# Patient Record
Sex: MALE | Race: WHITE | NOT HISPANIC OR LATINO
[De-identification: names, ages, dates, MRNs, and addresses within clinical notes are randomized per-mention and may not be internally consistent; named-entity substitution may affect disease eponyms.]

---

## 2017-04-22 ENCOUNTER — APPOINTMENT (OUTPATIENT)
Dept: HUMAN REPRODUCTION | Facility: CLINIC | Age: 42
End: 2017-04-22

## 2017-04-24 ENCOUNTER — APPOINTMENT (OUTPATIENT)
Dept: HUMAN REPRODUCTION | Facility: CLINIC | Age: 42
End: 2017-04-24

## 2017-05-12 ENCOUNTER — OTHER (OUTPATIENT)
Age: 42
End: 2017-05-12

## 2017-05-12 DIAGNOSIS — N97.9 FEMALE INFERTILITY, UNSPECIFIED: ICD-10-CM

## 2017-05-12 RX ORDER — DOXYCYCLINE HYCLATE 100 MG/1
100 CAPSULE ORAL
Qty: 20 | Refills: 0 | Status: ACTIVE | COMMUNITY
Start: 2017-05-12 | End: 1900-01-01

## 2017-05-19 ENCOUNTER — APPOINTMENT (OUTPATIENT)
Dept: HUMAN REPRODUCTION | Facility: CLINIC | Age: 42
End: 2017-05-19

## 2019-07-01 ENCOUNTER — APPOINTMENT (OUTPATIENT)
Dept: ORTHOPEDIC SURGERY | Facility: CLINIC | Age: 44
End: 2019-07-01
Payer: COMMERCIAL

## 2019-07-01 VITALS
SYSTOLIC BLOOD PRESSURE: 114 MMHG | BODY MASS INDEX: 25.76 KG/M2 | HEART RATE: 87 BPM | DIASTOLIC BLOOD PRESSURE: 77 MMHG | HEIGHT: 68 IN | WEIGHT: 170 LBS

## 2019-07-01 PROCEDURE — 99203 OFFICE O/P NEW LOW 30 MIN: CPT

## 2019-07-02 NOTE — REASON FOR VISIT
[Initial Visit] : an initial visit for [Back Pain] : back pain [Radiculopathy] : radiculopathy [FreeTextEntry2] : Lower back pain

## 2019-07-02 NOTE — CONSULT LETTER
[Dear  ___] : Dear  [unfilled], [Consult Letter:] : I had the pleasure of evaluating your patient, [unfilled]. [Please see my note below.] : Please see my note below. [Consult Closing:] : Thank you very much for allowing me to participate in the care of this patient.  If you have any questions, please do not hesitate to contact me. [Sincerely,] : Sincerely, [FreeTextEntry3] : bOdulio Shankar MD, FAAOS

## 2019-07-02 NOTE — HISTORY OF PRESENT ILLNESS
[Pain Location] : pain [] : left leg [Lumbar] : lumbar region [___ mths] : [unfilled] month(s) ago [Walking] : walking [Sitting] : sitting [Standing] : standing [Constant] : ~He/She~ states the symptoms seem to be constant [Prolonged Sitting] : worsened by prolonged sitting [Prolonged Standing] : worsened by prolonged standing [de-identified] : Initial visit patient comes in today referred by Dr. Ksenia Pinto complaining of lower back pain with pain into the left leg has had pain for approximately 3 months. Patient had first gone to his neurologist for an initial workup of recent onset back pain and had brought in an MRI that was done on April 20, 2019 and is currently taking Cymbalta and Neurontin at night with some relief of pain patient has also undergone pain management with an epidural steroid injection which has improved significantly her pain to the lower back and into the left leg. Patient denies any trauma. PMH is significant for microdiscectomy several years ago for a left L 4-5 HNP. [NSAIDs] : not relieved by nonsteroidal anti-inflammatory drugs [Rest] : not relieved with rest [de-identified] : EDGAR

## 2019-07-02 NOTE — PHYSICAL EXAM
[Motor Strength Lower Extremities] : left (weak) [] : Sensory: [L3-LT] : L3 [L4-LT] : L4 [L5-LT] : L5 [ALL] : Biceps, brachioradialis, triceps, patellar, ankle and plantar 2+ and symmetric bilaterally [Normal RLE] : Right Lower Extremity: No scars, rashes, lesions, ulcers, skin intact [Normal LLE] : Left Lower Extremity: No scars, rashes, lesions, ulcers, skin intact [Normal DTR Reflexes] : DTR reflexes normal [Normal Touch] : sensation intact for touch [Normal] : Gait: normal [SLR] : negative straight leg raise [Poor Appearance] : well-appearing [Acute Distress] : not in acute distress [Obese] : not obese [Abl Affect] : with normal affect [Poor Coordination] : normal coordination [Disorientation] : oriented x 3 [de-identified] : +tension sign of the left leg with considerable weakness with left quad.\par weakness with toe walk [de-identified] : MRI of the lumbar spine April 20, 2019 \par three disc levels were noted of L2-3 tiny bulge at L3-4 but had the surgical procedure in the past of a discectomy with small residual. L4-5 spinal stenosis and left-sided impingement

## 2019-07-02 NOTE — DISCUSSION/SUMMARY
[Medication Risks Reviewed] : Medication risks reviewed [Surgical risks reviewed] : Surgical risks reviewed [de-identified] : Continue to followup with his neurologist.\par If there should be any increased weakness to that left leg immediately we follow up with us otherwise patient is instructed no bending no twisting no lifting more than 10 pounds and followup in 3 months

## 2019-09-16 ENCOUNTER — EMERGENCY (EMERGENCY)
Facility: HOSPITAL | Age: 44
LOS: 1 days | Discharge: HOME | End: 2019-09-16
Attending: EMERGENCY MEDICINE
Payer: COMMERCIAL

## 2019-09-16 VITALS
HEIGHT: 68 IN | HEART RATE: 83 BPM | DIASTOLIC BLOOD PRESSURE: 88 MMHG | WEIGHT: 169.98 LBS | SYSTOLIC BLOOD PRESSURE: 146 MMHG | RESPIRATION RATE: 17 BRPM | OXYGEN SATURATION: 100 % | TEMPERATURE: 98 F

## 2019-09-16 PROCEDURE — 99283 EMERGENCY DEPT VISIT LOW MDM: CPT

## 2019-09-16 PROCEDURE — 99282 EMERGENCY DEPT VISIT SF MDM: CPT

## 2019-09-17 NOTE — ED PROVIDER NOTE - CLINICAL SUMMARY MEDICAL DECISION MAKING FREE TEXT BOX
ATTG: : headache that resolved. possible post epidural headache / migraine / tension headche. vitals wnl, neuro intact. and headache resolved. does not want any meds. dc home to follow with his pmd and neuro. return precautions provided.

## 2019-09-17 NOTE — ED PROVIDER NOTE - PATIENT PORTAL LINK FT
You can access the FollowMyHealth Patient Portal offered by [facility] by registering at the following website: [email or web address]. By joining [facility]’s FollowMyHealth portal, you will also be able to view your health information using other applications (apps) compatible with our system.

## 2019-09-17 NOTE — ED ADULT NURSE NOTE - OBJECTIVE STATEMENT
43 yo male c/o headache, since resolved since coming to ED. Pt evaluated by MD, no interventions/tests performed. Plan for DC.

## 2019-09-17 NOTE — ED PROVIDER NOTE - NS ED MD EM SELECTION
SHIFT SUMMARY
PT A&O X4 T/O SHIFT. POD#6 ILEOSTOMY TAKE DOWN; DRESSING CHANGE PER
ORDERS. SCANT DRAINAGE ON DRESSING. ABD SOFT; BTX4; PT PASSING GAS AND
LOOSE STOOL. PT DENIES NAUSEA. PAIN MANGED PER EMAR. PT BED MOBILE;
REPOSITIONS SELF IN BED.  PT UP TO BSC WITH FWW AND SBA. PT AMBULATED IN PUGA
X1. RA; PT DENIED SOB AND CP T/O SHIFT. CALL LIGHT IN REACH; PT DEMONSTRATES
USE. REPORT GIVEN TO DAY SHIFT RN. [id number] Exp Problem Focused - Mod. Complex

## 2019-09-17 NOTE — ED PROVIDER NOTE - ATTENDING CONTRIBUTION TO CARE
43 y/o m with pmhx disc disease, went for epidural last week presents for headache that began this am when he woke up. no fever or chills. no neck pain. no trauma. no change in vision or hearing or speech. no dizziness or difficulty with gait. no urinary complaints. no other concerns. here with father at the bedside. took apap prior to coming to ED. headache not associated with incontinence / weakness or numbness. concerned that usually does not get headaches like this. headache has resolved while waiting here.   PMD and Neuro thru [facility]  Gen no acute distress  HEENT:  pupils 2.5 mm and reactive to light. no nystagmus. no facial asymmetry. no tongue deviation.  Lungs:  b/l BS  CVS: S1S2   Abd;  soft non tender no distention  Ext: no pitting edema or erythema  Neuro: aaox3 no focal deficits, normal finger to nose, steady gait.   MSK: strength 5/5 b/l upper and lower ext.

## 2019-09-17 NOTE — ED PROVIDER NOTE - OBJECTIVE STATEMENT
45 y/o m with pmhx disc disease, went for epidural last week presents for headache that began this am when he woke up. no fever or chills. no neck pain. no trauma. no change in vision or hearing or speech. no dizziness or difficulty with gait. no urinary complaints. no other concerns. here with father at the bedside. took apap prior to coming to ED. headache not associated with incontinence / weakness or numbness. concerned that usually does not get headaches like this. headache has resolved while waiting here.  PMD and Neuro thru [facility]

## 2019-09-17 NOTE — ED PROVIDER NOTE - NSFOLLOWUPINSTRUCTIONS_ED_ALL_ED_FT
Headache    A headache is pain or discomfort felt around the head or neck area. The specific cause of a headache may not be found as there are many types including tension headaches, migraine headaches, and cluster headaches. Watch your condition for any changes. Things you can do to manage your pain include taking over the counter and prescription medications as instructed by your health care provider, lying down in a dark quiet room, limiting stress, getting regular sleep, and refraining from alcohol and tobacco products.    SEEK IMMEDIATE MEDICAL CARE IF YOU HAVE ANY OF THE FOLLOWING SYMPTOMS: fever, vomiting, stiff neck, loss of vision, problems with speech, muscle weakness, loss of balance, trouble walking, passing out, or confusion.     Continue taking Acetaminophen as needed for headache or alternatively Ibuprofen as directed.   Return immediately if any weakness / numbness / change in vision / speech / hearing or gait.

## 2019-09-17 NOTE — ED PROVIDER NOTE - PHYSICAL EXAMINATION
Gen no acute distress  HEENT:  pupils 2.5 mm and reactive to light. no nystagmus. no facial asymmetry. no tongue deviation.  Lungs:  b/l BS  CVS: S1S2   Abd;  soft non tender no distention  Ext: no pitting edema or erythema  Neuro: aaox3 no focal deficits, normal finger to nose, steady gait.   MSK: strength 5/5 b/l upper and lower ext

## 2019-09-17 NOTE — ED PROVIDER NOTE - NSFOLLOWUPCLINICS_GEN_ALL_ED_FT
[facility]  Neurology  [address] [place] [address] [address] [place] [address]  Phone: [phone]  Fax:   Follow Up Time:

## 2019-10-02 ENCOUNTER — APPOINTMENT (OUTPATIENT)
Dept: ORTHOPEDIC SURGERY | Facility: CLINIC | Age: 44
End: 2019-10-02

## 2019-11-06 ENCOUNTER — APPOINTMENT (OUTPATIENT)
Dept: ORTHOPEDIC SURGERY | Facility: CLINIC | Age: 44
End: 2019-11-06
Payer: COMMERCIAL

## 2019-11-06 DIAGNOSIS — M51.26 OTHER INTERVERTEBRAL DISC DISPLACEMENT, LUMBAR REGION: ICD-10-CM

## 2019-11-06 PROCEDURE — 99214 OFFICE O/P EST MOD 30 MIN: CPT

## 2020-02-10 ENCOUNTER — APPOINTMENT (OUTPATIENT)
Dept: ORTHOPEDIC SURGERY | Facility: CLINIC | Age: 45
End: 2020-02-10
Payer: COMMERCIAL

## 2020-02-10 VITALS
HEIGHT: 68 IN | BODY MASS INDEX: 25.76 KG/M2 | WEIGHT: 170 LBS | DIASTOLIC BLOOD PRESSURE: 76 MMHG | SYSTOLIC BLOOD PRESSURE: 127 MMHG | HEART RATE: 116 BPM

## 2020-02-10 PROCEDURE — 99213 OFFICE O/P EST LOW 20 MIN: CPT

## 2020-02-10 NOTE — HISTORY OF PRESENT ILLNESS
[Pain Location] : pain [] : left leg [2] : a current pain level of 2/10 [7] : a minimum pain level of 7/10 [Stable] : stable [Constant] : ~He/She~ states the symptoms seem to be constant [de-identified] : Follow up appointment from his last office visit of November 6, 2019 with left leg anterior thigh twitching and left big toe numb with no strength.\par Patient did not participate with physical therapy.\mik Continues to follow up with his neurologist with Dr. Pinto and will be undergoing an MRI of the lumbar spine this coming friday

## 2020-02-10 NOTE — PHYSICAL EXAM
[Normal LLE] : Left Lower Extremity: No scars, rashes, lesions, ulcers, skin intact [Normal RLE] : Right Lower Extremity: No scars, rashes, lesions, ulcers, skin intact [Normal] : No costovertebral angle tenderness and no spinal tenderness [de-identified] : EHL no motor function

## 2020-02-10 NOTE — DISCUSSION/SUMMARY
[de-identified] : Patient to continue with following up with Neurology, MRI of the lumbar spine for this Friday, sooner if the neurologist deems it so.\par

## 2020-03-11 ENCOUNTER — APPOINTMENT (OUTPATIENT)
Dept: ORTHOPEDIC SURGERY | Facility: CLINIC | Age: 45
End: 2020-03-11
Payer: COMMERCIAL

## 2020-03-11 VITALS — SYSTOLIC BLOOD PRESSURE: 147 MMHG | HEART RATE: 97 BPM | DIASTOLIC BLOOD PRESSURE: 84 MMHG

## 2020-03-11 DIAGNOSIS — M48.061 SPINAL STENOSIS, LUMBAR REGION WITHOUT NEUROGENIC CLAUDICATION: ICD-10-CM

## 2020-03-11 PROCEDURE — 99214 OFFICE O/P EST MOD 30 MIN: CPT

## 2021-11-22 ENCOUNTER — APPOINTMENT (OUTPATIENT)
Dept: ORTHOPEDIC SURGERY | Facility: CLINIC | Age: 46
End: 2021-11-22
Payer: COMMERCIAL

## 2021-11-22 VITALS
DIASTOLIC BLOOD PRESSURE: 79 MMHG | SYSTOLIC BLOOD PRESSURE: 143 MMHG | HEIGHT: 68 IN | BODY MASS INDEX: 26.52 KG/M2 | WEIGHT: 175 LBS | HEART RATE: 92 BPM

## 2021-11-22 PROCEDURE — 99214 OFFICE O/P EST MOD 30 MIN: CPT

## 2021-11-23 ENCOUNTER — TRANSCRIPTION ENCOUNTER (OUTPATIENT)
Age: 46
End: 2021-11-23

## 2021-11-30 ENCOUNTER — APPOINTMENT (OUTPATIENT)
Dept: NEUROSURGERY | Facility: CLINIC | Age: 46
End: 2021-11-30

## 2021-12-01 ENCOUNTER — NON-APPOINTMENT (OUTPATIENT)
Age: 46
End: 2021-12-01

## 2021-12-01 ENCOUNTER — APPOINTMENT (OUTPATIENT)
Dept: ORTHOPEDIC SURGERY | Facility: CLINIC | Age: 46
End: 2021-12-01
Payer: COMMERCIAL

## 2021-12-01 VITALS
DIASTOLIC BLOOD PRESSURE: 82 MMHG | WEIGHT: 175 LBS | BODY MASS INDEX: 26.52 KG/M2 | HEIGHT: 68 IN | SYSTOLIC BLOOD PRESSURE: 137 MMHG

## 2021-12-01 PROCEDURE — 99214 OFFICE O/P EST MOD 30 MIN: CPT

## 2021-12-03 ENCOUNTER — APPOINTMENT (OUTPATIENT)
Dept: ORTHOPEDIC SURGERY | Facility: CLINIC | Age: 46
End: 2021-12-03
Payer: COMMERCIAL

## 2021-12-03 VITALS — WEIGHT: 175 LBS | HEIGHT: 68 IN | BODY MASS INDEX: 26.52 KG/M2

## 2021-12-03 DIAGNOSIS — M51.26 OTHER INTERVERTEBRAL DISC DISPLACEMENT, LUMBAR REGION: ICD-10-CM

## 2021-12-03 PROCEDURE — 72100 X-RAY EXAM L-S SPINE 2/3 VWS: CPT

## 2021-12-03 PROCEDURE — 99215 OFFICE O/P EST HI 40 MIN: CPT

## 2021-12-03 RX ORDER — DIAZEPAM 5 MG/1
5 TABLET ORAL
Qty: 60 | Refills: 0 | Status: ACTIVE | COMMUNITY
Start: 2021-11-12

## 2021-12-03 RX ORDER — PREDNISONE 10 MG/1
10 TABLET ORAL
Qty: 60 | Refills: 0 | Status: ACTIVE | COMMUNITY
Start: 2021-11-07

## 2021-12-03 RX ORDER — DICLOFENAC SODIUM 100 MG/1
100 TABLET, FILM COATED, EXTENDED RELEASE ORAL
Qty: 20 | Refills: 0 | Status: ACTIVE | COMMUNITY
Start: 2021-11-10

## 2021-12-03 RX ORDER — DULOXETINE HYDROCHLORIDE 30 MG/1
30 CAPSULE, DELAYED RELEASE PELLETS ORAL
Qty: 30 | Refills: 0 | Status: ACTIVE | COMMUNITY
Start: 2021-10-11

## 2021-12-03 RX ORDER — LIDOCAINE 5% 700 MG/1
5 PATCH TOPICAL
Qty: 30 | Refills: 0 | Status: ACTIVE | COMMUNITY
Start: 2021-11-10

## 2021-12-03 NOTE — HISTORY OF PRESENT ILLNESS
[de-identified] : This is a 46-year-old male that is here today for evaluation of his back and left lower extremity symptoms.  He does have pain which radiates down his left anterior lateral thigh.  His symptoms are above his knee.  He does feel diffusely weaker in his left leg.  The symptoms been ongoing and worsening over the past 4 weeks.  He has been previously treated with epidural steroid injection as well as oral steroids.  Unfortunately symptoms have persisted.  He does have a previous history of an L3-L4 microdiscectomy which she did well from.  He is here today to discuss his treatment options.  He denies any bowel bladder issues.  He denies any saddle anesthesia.

## 2021-12-03 NOTE — PHYSICAL EXAM
[de-identified] : Lumbar Physical Exam\par \par Gait - Slightly antalgic gait\par \par Station - Normal\par \par Sagittal balance - Normal\par \par Compensatory mechanism? - None\par \par \par Reflexes\par Patellar - normal\par Gastroc - normal\par Clonus - No\par \par Hip Exam - Normal\par \par Straight leg raise - none\par \par Pulses - 2+ dp/pt\par \par Range of motion - normal\par \par Sensation \par Sensation intact to light touch in L1, L2, L3, L4, L5 and S1 dermatomes bilaterally\par \par Motor\par 	IP	Quad	HS	TA	Gastroc	EHL\par Right	5/5	5/5	5/5	5/5	5/5	5/5\par Left	3+/5	3+/5	5/5	3+/5	5/5	5/5 [de-identified] : Lumbar MRI reviewed\par L2-L3 left-sided disc herniation with impingement on the exiting L2 nerve root as well as the descending L3 nerve root\par \par Lumbar radiographs\par Significant disc degeneration noted\par Disc height loss noted\par

## 2021-12-03 NOTE — ASSESSMENT
[FreeTextEntry1] : I do lengthy discussion with the patient regards to his treatment plan and diagnosis.  He has fairly profound weakness in his left leg.  It has been stable over the past several weeks.  He denies any bowel bladder issues.  His MRI does show a left-sided disc herniation with resultant stenosis against the exiting L2 nerve root as well as the descending L3 nerve root.  His symptoms match his MRI findings.  He has tried and failed conservative management including epidural steroid injections, activity modifications.  At this point I think he would benefit from a surgical intervention.  I did offer him a surgical date of next week but he would prefer to have his surgery done on December 16.  This is reasonable.  I would like to keep a close clinical eye on the patient.  I will see him again next week to ensure his weakness is not progressing.  Furthermore I did give him my direct contact information and I encouraged him to reach out to me at any point if he has any new or worsening symptoms.  He can also go to the ED if any of this happens.  I will see him back next weeks as stated above.  I did discuss the surgical plan and what it would entail in terms of postoperative recovery.  At his next appointment I will go over the surgical consent as well.  Please note that over 40 minutes of time spent in care this patient which includes previsit preparation, in person visit, post visit documentation, discussion with colleagues including the referring surgeon.

## 2021-12-08 ENCOUNTER — APPOINTMENT (OUTPATIENT)
Dept: ORTHOPEDIC SURGERY | Facility: CLINIC | Age: 46
End: 2021-12-08
Payer: COMMERCIAL

## 2021-12-08 ENCOUNTER — OUTPATIENT (OUTPATIENT)
Dept: OUTPATIENT SERVICES | Facility: HOSPITAL | Age: 46
LOS: 1 days | End: 2021-12-08
Payer: COMMERCIAL

## 2021-12-08 VITALS
RESPIRATION RATE: 18 BRPM | WEIGHT: 179.9 LBS | HEIGHT: 68 IN | OXYGEN SATURATION: 100 % | TEMPERATURE: 98 F | DIASTOLIC BLOOD PRESSURE: 88 MMHG | SYSTOLIC BLOOD PRESSURE: 130 MMHG | HEART RATE: 96 BPM

## 2021-12-08 DIAGNOSIS — M54.16 RADICULOPATHY, LUMBAR REGION: ICD-10-CM

## 2021-12-08 DIAGNOSIS — Z98.890 OTHER SPECIFIED POSTPROCEDURAL STATES: Chronic | ICD-10-CM

## 2021-12-08 DIAGNOSIS — M51.26 OTHER INTERVERTEBRAL DISC DISPLACEMENT, LUMBAR REGION: ICD-10-CM

## 2021-12-08 DIAGNOSIS — Z29.9 ENCOUNTER FOR PROPHYLACTIC MEASURES, UNSPECIFIED: ICD-10-CM

## 2021-12-08 DIAGNOSIS — Z01.818 ENCOUNTER FOR OTHER PREPROCEDURAL EXAMINATION: ICD-10-CM

## 2021-12-08 LAB
ANION GAP SERPL CALC-SCNC: 13 MMOL/L — SIGNIFICANT CHANGE UP (ref 5–17)
BLD GP AB SCN SERPL QL: NEGATIVE — SIGNIFICANT CHANGE UP
BUN SERPL-MCNC: 14 MG/DL — SIGNIFICANT CHANGE UP (ref 7–23)
CALCIUM SERPL-MCNC: 9.7 MG/DL — SIGNIFICANT CHANGE UP (ref 8.4–10.5)
CHLORIDE SERPL-SCNC: 103 MMOL/L — SIGNIFICANT CHANGE UP (ref 96–108)
CO2 SERPL-SCNC: 24 MMOL/L — SIGNIFICANT CHANGE UP (ref 22–31)
CREAT SERPL-MCNC: 0.85 MG/DL — SIGNIFICANT CHANGE UP (ref 0.5–1.3)
GLUCOSE SERPL-MCNC: 94 MG/DL — SIGNIFICANT CHANGE UP (ref 70–99)
HCT VFR BLD CALC: 39.2 % — SIGNIFICANT CHANGE UP (ref 39–50)
HGB BLD-MCNC: 11.8 G/DL — LOW (ref 13–17)
MCHC RBC-ENTMCNC: 23 PG — LOW (ref 27–34)
MCHC RBC-ENTMCNC: 30.1 GM/DL — LOW (ref 32–36)
MCV RBC AUTO: 76.6 FL — LOW (ref 80–100)
NRBC # BLD: 0 /100 WBCS — SIGNIFICANT CHANGE UP (ref 0–0)
PLATELET # BLD AUTO: 316 K/UL — SIGNIFICANT CHANGE UP (ref 150–400)
POTASSIUM SERPL-MCNC: 3.6 MMOL/L — SIGNIFICANT CHANGE UP (ref 3.5–5.3)
POTASSIUM SERPL-SCNC: 3.6 MMOL/L — SIGNIFICANT CHANGE UP (ref 3.5–5.3)
RBC # BLD: 5.12 M/UL — SIGNIFICANT CHANGE UP (ref 4.2–5.8)
RBC # FLD: 15.1 % — HIGH (ref 10.3–14.5)
RH IG SCN BLD-IMP: POSITIVE — SIGNIFICANT CHANGE UP
SODIUM SERPL-SCNC: 140 MMOL/L — SIGNIFICANT CHANGE UP (ref 135–145)
WBC # BLD: 5.84 K/UL — SIGNIFICANT CHANGE UP (ref 3.8–10.5)
WBC # FLD AUTO: 5.84 K/UL — SIGNIFICANT CHANGE UP (ref 3.8–10.5)

## 2021-12-08 PROCEDURE — 86901 BLOOD TYPING SEROLOGIC RH(D): CPT

## 2021-12-08 PROCEDURE — 99215 OFFICE O/P EST HI 40 MIN: CPT

## 2021-12-08 PROCEDURE — 80048 BASIC METABOLIC PNL TOTAL CA: CPT

## 2021-12-08 PROCEDURE — G0463: CPT

## 2021-12-08 PROCEDURE — 86850 RBC ANTIBODY SCREEN: CPT

## 2021-12-08 PROCEDURE — 85027 COMPLETE CBC AUTOMATED: CPT

## 2021-12-08 PROCEDURE — 36415 COLL VENOUS BLD VENIPUNCTURE: CPT

## 2021-12-08 PROCEDURE — 87640 STAPH A DNA AMP PROBE: CPT

## 2021-12-08 PROCEDURE — 87641 MR-STAPH DNA AMP PROBE: CPT

## 2021-12-08 PROCEDURE — 86900 BLOOD TYPING SEROLOGIC ABO: CPT

## 2021-12-08 RX ORDER — CEFAZOLIN SODIUM 1 G
2000 VIAL (EA) INJECTION ONCE
Refills: 0 | Status: DISCONTINUED | OUTPATIENT
Start: 2021-12-16 | End: 2021-12-16

## 2021-12-08 NOTE — PHYSICAL EXAM
[de-identified] : Lumbar Physical Exam\par \par Gait - Slightly antalgic gait\par \par Station - Normal\par \par Sagittal balance - Normal\par \par Compensatory mechanism? - None\par \par \par Reflexes\par Patellar - normal\par Gastroc - normal\par Clonus - No\par \par Hip Exam - Normal\par \par Straight leg raise - none\par \par Pulses - 2+ dp/pt\par \par Range of motion - normal\par \par Sensation \par Sensation intact to light touch in L1, L2, L3, L4, L5 and S1 dermatomes bilaterally\par \par Motor\par 	IP	Quad	HS	TA	Gastroc	EHL\par Right	5/5	5/5	5/5	5/5	5/5	5/5\par Left	3+/5	3+/5	5/5	3+/5	5/5	5/5 [de-identified] : Lumbar MRI reviewed\par L2-L3 left-sided disc herniation with impingement on the exiting L2 nerve root as well as the descending L3 nerve root\par \par Lumbar radiographs\par Significant disc degeneration noted\par Disc height loss noted\par

## 2021-12-08 NOTE — H&P PST ADULT - NSANTHOSAYNRD_GEN_A_CORE
No. KEI screening performed.  STOP BANG Legend: 0-2 = LOW Risk; 3-4 = INTERMEDIATE Risk; 5-8 = HIGH Risk

## 2021-12-08 NOTE — H&P PST ADULT - NSICDXPASTMEDICALHX_GEN_ALL_CORE_FT
PAST MEDICAL HISTORY:  Disc displacement, lumbar     Other intervertebral disc displacement, lumbar region     Radiculopathy, lumbar region

## 2021-12-08 NOTE — ASSESSMENT
[FreeTextEntry1] : This is a 46-year-old male that is here today for evaluation of his left leg pain and back pain.  He has tried and failed conservative management including epidural steroid injections, physical therapy, activity modifications, NSAIDs, Tylenol.  He does have an L2-L3 left-sided disc herniation which corresponds to the area of his symptoms of weakness.  We discussed risks and benefits of operative and operative treatment.  We will proceed with an L2-L3 left-sided microdiscectomy.  Risks and benefits of the procedure were explained to the patient in great detail.  These risks included but not limited to pain, need for reoperation, nonresolution of symptoms, injury to surrounding nerves arteries and veins, paralysis, nerve root injury, CSF leak, dural tear, infection, DVT, PE.  Patient understood these risks and would like to proceed with the procedure.  Proper informed consent was obtained.\par \par We also discussed the postoperative recovery and how will likely be at least 6 weeks of no pushing pulling or carrying anything of significant weight.\par \par

## 2021-12-08 NOTE — H&P PST ADULT - NSICDXFAMILYHX_GEN_ALL_CORE_FT
FAMILY HISTORY:  Father  Still living? Unknown  Family history of spinal stenosis, Age at diagnosis: Age Unknown

## 2021-12-08 NOTE — H&P PST ADULT - ATTENDING COMMENTS
Agree with above. The patient has left HF weakness and quad weakness (both 3+/5), numbness over left thigh numbness (anterior thigh) corresponding to this L2-L3 left sided disc herniation. We will proceed with a left L2-L3 microdiscectomy. Risks and benefits discussed with the patient at length. Specific risks include pain, need for reoperation, injury to surrounding nerves/arteries/veins, dural tear, csf leak, paralysis, nerve root injury, reherniation, infection, dvt/pe. The patient agree to proceed with the surgery and proper informed consent was obtained.

## 2021-12-08 NOTE — H&P PST ADULT - PROBLEM SELECTOR PLAN 1
Scheduled for sx on 12/16/21. Surgical and chlorhexidine instructions reviewed w/ pt. COVID testing scheduled at [facility] on 12/13/2021.

## 2021-12-08 NOTE — H&P PST ADULT - FALL HARM RISK - HARM RISK INTERVENTIONS

## 2021-12-08 NOTE — HISTORY OF PRESENT ILLNESS
[de-identified] : Today the patient states that he still dealing with back and left lower extremity symptoms.  Fortunately he has had no worsening of his weakness.  He has had no issues with bowel bladder incontinence.  He has no saddle anesthesia.  He does still have numbness over his left anterior lateral thigh.  He is still having radicular type symptoms down his left leg as well.\par \par 12/03/21\par This is a 46-year-old male that is here today for evaluation of his back and left lower extremity symptoms.  He does have pain which radiates down his left anterior lateral thigh.  His symptoms are above his knee.  He does feel diffusely weaker in his left leg.  The symptoms been ongoing and worsening over the past 4 weeks.  He has been previously treated with epidural steroid injection as well as oral steroids.  Unfortunately symptoms have persisted.  He does have a previous history of an L3-L4 microdiscectomy which she did well from.  He is here today to discuss his treatment options.  He denies any bowel bladder issues.  He denies any saddle anesthesia.

## 2021-12-08 NOTE — H&P PST ADULT - ASSESSMENT
Itz Calix  [facility]  [address], [address]  [place] [address]  Phone: [phone]  Fax: [phone]  Follow Up Time: 1-3 Days  
CAPRINI SCORE [CLOT updated 18]    AGE RELATED RISK FACTORS                                                       MOBILITY RELATED FACTORS  [ ] Age 41-60 years                                            (1 Point)                    [ ] Bed rest                                                        (1 Point)  [ ] Age: 61-74 years                                           (2 Points)                  [ ] Plaster cast                                                   (2 Points)  [ ] Age= 75 years                                              (3 Points)                    [ ] Bed bound for more than 72 hours                 (2 Points)    DISEASE RELATED RISK FACTORS                                               GENDER SPECIFIC FACTORS  [ ] Edema in the lower extremities                       (1 Point)              [ ] Pregnancy                                                     (1 Point)  [ ] Varicose veins                                               (1 Point)                     [ ] Post-partum < 6 weeks                                   (1 Point)             [ ] BMI > 25 Kg/m2                                            (1 Point)                     [ ] Hormonal therapy  or oral contraception          (1 Point)                 [ ] Sepsis (in the previous month)                        (1 Point)               [ ] History of pregnancy complications                 (1 point)  [ ] Pneumonia or serious lung disease                                               [ ] Unexplained or recurrent                     (1 Point)           (in the previous month)                               (1 Point)  [ ] Abnormal pulmonary function test                     (1 Point)                 SURGERY RELATED RISK FACTORS  [ ] Acute myocardial infarction                              (1 Point)               [ ]  Section                                             (1 Point)  [ ] Congestive heart failure (in the previous month)  (1 Point)      [ ] Minor surgery                                                  (1 Point)   [ ] Inflammatory bowel disease                             (1 Point)               [ ] Arthroscopic surgery                                        (2 Points)  [ ] Central venous access                                      (2 Points)                [ ] General surgery lasting more than 45 minutes (2 points)  [ ] Present or previous malignancy                     (2 Points)                [ ] Elective arthroplasty                                         (5 points)    [ ] Stroke (in the previous month)                          (5 Points)                                                                                                                                                           HEMATOLOGY RELATED FACTORS                                                 TRAUMA RELATED RISK FACTORS  [ ] Prior episodes of VTE                                     (3 Points)                [ ] Fracture of the hip, pelvis, or leg                       (5 Points)  [ ] Positive family history for VTE                         (3 Points)             [ ] Acute spinal cord injury (in the previous month)  (5 Points)  [ ] Prothrombin 92374 A                                     (3 Points)               [ ] Paralysis  (less than 1 month)                             (5 Points)  [ ] Factor V Leiden                                             (3 Points)                  [ ] Multiple Trauma within 1 month                        (5 Points)  [ ] Lupus anticoagulants                                     (3 Points)                                                           [ ] Anticardiolipin antibodies                               (3 Points)                                                       [ ] High homocysteine in the blood                      (3 Points)                                             [ ] Other congenital or acquired thrombophilia      (3 Points)                                                [ ] Heparin induced thrombocytopenia                  (3 Points)                                     Total Score [ 4 ]

## 2021-12-08 NOTE — H&P PST ADULT - HISTORY OF PRESENT ILLNESS
47 yo male with hx of lumbar disc displacement and c/o LBP w/ left leg pain accompanied by left LE weakness, paresthesias and loss of sensation (s/p  left L3-L4 lumbar discectomy 2015') now presents to PST for a L2-L3 Left Sided Microdiscectomy on 12/16/2021. He has tried and failed conservative mgmt including epidural steroid injections, PT, activity modifications as well as NSAID's. He denies recent fevers, chills, cough, chest pain or SOB and feels well otherwise. COVID testing scheduled at [facility] on 12/13/2021.

## 2021-12-09 LAB
MRSA PCR RESULT.: SIGNIFICANT CHANGE UP
S AUREUS DNA NOSE QL NAA+PROBE: SIGNIFICANT CHANGE UP

## 2021-12-13 ENCOUNTER — OUTPATIENT (OUTPATIENT)
Dept: OUTPATIENT SERVICES | Facility: HOSPITAL | Age: 46
LOS: 1 days | End: 2021-12-13
Payer: COMMERCIAL

## 2021-12-13 DIAGNOSIS — Z11.52 ENCOUNTER FOR SCREENING FOR COVID-19: ICD-10-CM

## 2021-12-13 DIAGNOSIS — Z98.890 OTHER SPECIFIED POSTPROCEDURAL STATES: Chronic | ICD-10-CM

## 2021-12-13 LAB — SARS-COV-2 RNA SPEC QL NAA+PROBE: SIGNIFICANT CHANGE UP

## 2021-12-13 PROCEDURE — U0005: CPT

## 2021-12-13 PROCEDURE — U0003: CPT

## 2021-12-13 PROCEDURE — C9803: CPT

## 2021-12-15 ENCOUNTER — TRANSCRIPTION ENCOUNTER (OUTPATIENT)
Age: 46
End: 2021-12-15

## 2021-12-16 ENCOUNTER — APPOINTMENT (OUTPATIENT)
Dept: ORTHOPEDIC SURGERY | Facility: HOSPITAL | Age: 46
End: 2021-12-16

## 2021-12-16 ENCOUNTER — RESULT REVIEW (OUTPATIENT)
Age: 46
End: 2021-12-16

## 2021-12-16 ENCOUNTER — INPATIENT (INPATIENT)
Facility: HOSPITAL | Age: 46
LOS: 0 days | Discharge: HOME | DRG: 520 | End: 2021-12-16
Attending: GENERAL PRACTICE | Admitting: GENERAL PRACTICE
Payer: COMMERCIAL

## 2021-12-16 VITALS
DIASTOLIC BLOOD PRESSURE: 75 MMHG | TEMPERATURE: 98 F | SYSTOLIC BLOOD PRESSURE: 117 MMHG | WEIGHT: 179.9 LBS | RESPIRATION RATE: 17 BRPM | OXYGEN SATURATION: 98 % | HEIGHT: 68 IN | HEART RATE: 96 BPM

## 2021-12-16 VITALS
OXYGEN SATURATION: 97 % | RESPIRATION RATE: 16 BRPM | SYSTOLIC BLOOD PRESSURE: 118 MMHG | TEMPERATURE: 97 F | DIASTOLIC BLOOD PRESSURE: 68 MMHG | HEART RATE: 95 BPM

## 2021-12-16 DIAGNOSIS — M51.26 OTHER INTERVERTEBRAL DISC DISPLACEMENT, LUMBAR REGION: ICD-10-CM

## 2021-12-16 DIAGNOSIS — Z98.890 OTHER SPECIFIED POSTPROCEDURAL STATES: Chronic | ICD-10-CM

## 2021-12-16 DIAGNOSIS — M54.16 RADICULOPATHY, LUMBAR REGION: ICD-10-CM

## 2021-12-16 PROCEDURE — 88304 TISSUE EXAM BY PATHOLOGIST: CPT | Mod: 26

## 2021-12-16 PROCEDURE — 76000 FLUOROSCOPY <1 HR PHYS/QHP: CPT

## 2021-12-16 PROCEDURE — C1889: CPT

## 2021-12-16 PROCEDURE — 97161 PT EVAL LOW COMPLEX 20 MIN: CPT

## 2021-12-16 PROCEDURE — 63030 LAMOT DCMPRN NRV RT 1 LMBR: CPT | Mod: LT

## 2021-12-16 PROCEDURE — 88304 TISSUE EXAM BY PATHOLOGIST: CPT

## 2021-12-16 RX ORDER — TIZANIDINE 4 MG/1
1 TABLET ORAL
Qty: 21 | Refills: 0
Start: 2021-12-16 | End: 2021-12-22

## 2021-12-16 RX ORDER — DULOXETINE HYDROCHLORIDE 30 MG/1
1 CAPSULE, DELAYED RELEASE ORAL
Qty: 0 | Refills: 0 | DISCHARGE

## 2021-12-16 RX ORDER — KETOROLAC TROMETHAMINE 30 MG/ML
1 SYRINGE (ML) INJECTION
Qty: 20 | Refills: 0
Start: 2021-12-16 | End: 2021-12-20

## 2021-12-16 RX ORDER — ONDANSETRON 8 MG/1
4 TABLET, FILM COATED ORAL EVERY 6 HOURS
Refills: 0 | Status: DISCONTINUED | OUTPATIENT
Start: 2021-12-16 | End: 2021-12-16

## 2021-12-16 RX ORDER — ACETAMINOPHEN 500 MG
975 TABLET ORAL EVERY 6 HOURS
Refills: 0 | Status: DISCONTINUED | OUTPATIENT
Start: 2021-12-16 | End: 2021-12-16

## 2021-12-16 RX ORDER — SODIUM CHLORIDE 9 MG/ML
1000 INJECTION, SOLUTION INTRAVENOUS
Refills: 0 | Status: DISCONTINUED | OUTPATIENT
Start: 2021-12-16 | End: 2021-12-16

## 2021-12-16 RX ORDER — ONDANSETRON 8 MG/1
4 TABLET, FILM COATED ORAL ONCE
Refills: 0 | Status: DISCONTINUED | OUTPATIENT
Start: 2021-12-16 | End: 2021-12-16

## 2021-12-16 RX ORDER — OMEPRAZOLE 10 MG/1
1 CAPSULE, DELAYED RELEASE ORAL
Qty: 14 | Refills: 0
Start: 2021-12-16 | End: 2021-12-29

## 2021-12-16 RX ORDER — APREPITANT 80 MG/1
40 CAPSULE ORAL ONCE
Refills: 0 | Status: COMPLETED | OUTPATIENT
Start: 2021-12-16 | End: 2021-12-16

## 2021-12-16 RX ORDER — DEXAMETHASONE 0.5 MG/5ML
8 ELIXIR ORAL EVERY 8 HOURS
Refills: 0 | Status: DISCONTINUED | OUTPATIENT
Start: 2021-12-16 | End: 2021-12-16

## 2021-12-16 RX ORDER — DOCUSATE SODIUM 100 MG
1 CAPSULE ORAL
Qty: 14 | Refills: 0
Start: 2021-12-16 | End: 2021-12-22

## 2021-12-16 RX ORDER — CYCLOBENZAPRINE HYDROCHLORIDE 10 MG/1
10 TABLET, FILM COATED ORAL EVERY 8 HOURS
Refills: 0 | Status: DISCONTINUED | OUTPATIENT
Start: 2021-12-16 | End: 2021-12-16

## 2021-12-16 RX ORDER — HYDROMORPHONE HYDROCHLORIDE 2 MG/ML
0.5 INJECTION INTRAMUSCULAR; INTRAVENOUS; SUBCUTANEOUS EVERY 4 HOURS
Refills: 0 | Status: DISCONTINUED | OUTPATIENT
Start: 2021-12-16 | End: 2021-12-16

## 2021-12-16 RX ORDER — TRAMADOL HYDROCHLORIDE 50 MG/1
1 TABLET ORAL
Qty: 25 | Refills: 0
Start: 2021-12-16

## 2021-12-16 RX ORDER — CEFAZOLIN SODIUM 1 G
2000 VIAL (EA) INJECTION EVERY 8 HOURS
Refills: 0 | Status: DISCONTINUED | OUTPATIENT
Start: 2021-12-16 | End: 2021-12-16

## 2021-12-16 RX ORDER — CHLORHEXIDINE GLUCONATE 213 G/1000ML
1 SOLUTION TOPICAL ONCE
Refills: 0 | Status: DISCONTINUED | OUTPATIENT
Start: 2021-12-16 | End: 2021-12-16

## 2021-12-16 RX ORDER — LIDOCAINE HCL 20 MG/ML
0.2 VIAL (ML) INJECTION ONCE
Refills: 0 | Status: DISCONTINUED | OUTPATIENT
Start: 2021-12-16 | End: 2021-12-16

## 2021-12-16 RX ORDER — HYDROMORPHONE HYDROCHLORIDE 2 MG/ML
0.5 INJECTION INTRAMUSCULAR; INTRAVENOUS; SUBCUTANEOUS
Refills: 0 | Status: DISCONTINUED | OUTPATIENT
Start: 2021-12-16 | End: 2021-12-16

## 2021-12-16 RX ORDER — SENNA PLUS 8.6 MG/1
2 TABLET ORAL AT BEDTIME
Refills: 0 | Status: DISCONTINUED | OUTPATIENT
Start: 2021-12-16 | End: 2021-12-16

## 2021-12-16 RX ORDER — SODIUM CHLORIDE 9 MG/ML
3 INJECTION INTRAMUSCULAR; INTRAVENOUS; SUBCUTANEOUS EVERY 8 HOURS
Refills: 0 | Status: DISCONTINUED | OUTPATIENT
Start: 2021-12-16 | End: 2021-12-16

## 2021-12-16 RX ADMIN — APREPITANT 40 MILLIGRAM(S): 80 CAPSULE ORAL at 07:18

## 2021-12-16 NOTE — ASU DISCHARGE PLAN (ADULT/PEDIATRIC) - NURSING INSTRUCTIONS
Follow-up in 2 weeks with Dr. Salas. Call to schedule. Dressing can be removed in 5 days. Shower in 5 days, do not face incision toward shower. Take toradol for 5 days standing, tramadol for 25 tables, medrol dose pack, omeprazole, and colace. Take tizanide for muscle spasms as needed. If remains feverish after 2 days with temperatures greater than 101, can call office for further instruction. Follow-up in 2 weeks with Dr. Salas. Call to schedule. Dressing stays until appointment. Shower in 5 days, do not face incision toward shower. Take toradol for 5 days standing, tramadol for 25 tables, medrol dose pack, omeprazole, and colace. Take tizanide for muscle spasms as needed. If remains feverish after 2 days with temperatures greater than 101, can call office for further instruction.

## 2021-12-16 NOTE — PATIENT PROFILE ADULT - FALL HARM RISK - UNIVERSAL INTERVENTIONS
Bed in lowest position, wheels locked, appropriate side rails in place/Call bell, personal items and telephone in reach/Instruct patient to call for assistance before getting out of bed or chair/Non-slip footwear when patient is out of bed/[place] to call system/Physically safe environment - no spills, clutter or unnecessary equipment/Purposeful Proactive Rounding/Room/bathroom lighting operational, light cord in reach

## 2021-12-16 NOTE — PHYSICAL THERAPY INITIAL EVALUATION ADULT - PERTINENT HX OF CURRENT PROBLEM, REHAB EVAL
45 yo male with hx of lumbar disc displacement and c/o LBP w/ left leg pain accompanied by left LE weakness, paresthesias and loss of sensation (s/p  left L3-L4 lumbar discectomy 2015') now presents for a L2-L3 Left Sided Microdiscectomy on 12/16/2021.

## 2021-12-16 NOTE — ASU DISCHARGE PLAN (ADULT/PEDIATRIC) - CARE PROVIDER_API CALL
Garfield Salas (MD)  [facility]  [address], [address]  [place] [address]  Phone: [phone]  Fax: [phone]  Follow Up Time:

## 2021-12-16 NOTE — PHYSICAL THERAPY INITIAL EVALUATION ADULT - ADDITIONAL COMMENTS
as per pt: PTA pt was living in a PH + Stairs and was independent in all functional mobility and ADL's. no AD for gait.

## 2021-12-28 LAB — SURGICAL PATHOLOGY STUDY: SIGNIFICANT CHANGE UP

## 2021-12-29 ENCOUNTER — APPOINTMENT (OUTPATIENT)
Dept: ORTHOPEDIC SURGERY | Facility: CLINIC | Age: 46
End: 2021-12-29
Payer: COMMERCIAL

## 2021-12-29 VITALS
HEART RATE: 96 BPM | BODY MASS INDEX: 26.52 KG/M2 | DIASTOLIC BLOOD PRESSURE: 88 MMHG | SYSTOLIC BLOOD PRESSURE: 130 MMHG | HEIGHT: 68 IN | WEIGHT: 175 LBS

## 2021-12-29 PROCEDURE — 99024 POSTOP FOLLOW-UP VISIT: CPT

## 2021-12-29 NOTE — HISTORY OF PRESENT ILLNESS
[de-identified] : Today the patient states that he has been feeling like his left leg is stronger now that he is now 2 weeks status post microdiscectomy. He states that he has no severe radicular type symptoms. He does have some mild to moderate left leg cramping but this is currently controlled with medical medications. He has no bowel bladder issues. He has no saddle anesthesia. He is off all pain medications besides an occasional tramadol. Overall he is very pleased with his recovery to date.\par \par 12/08/21\par Today the patient states that he still dealing with back and left lower extremity symptoms.  Fortunately he has had no worsening of his weakness.  He has had no issues with bowel bladder incontinence.  He has no saddle anesthesia.  He does still have numbness over his left anterior lateral thigh.  He is still having radicular type symptoms down his left leg as well.\par \par 12/03/21\par This is a 46-year-old male that is here today for evaluation of his back and left lower extremity symptoms.  He does have pain which radiates down his left anterior lateral thigh.  His symptoms are above his knee.  He does feel diffusely weaker in his left leg.  The symptoms been ongoing and worsening over the past 4 weeks.  He has been previously treated with epidural steroid injection as well as oral steroids.  Unfortunately symptoms have persisted.  He does have a previous history of an L3-L4 microdiscectomy which she did well from.  He is here today to discuss his treatment options.  He denies any bowel bladder issues.  He denies any saddle anesthesia.

## 2021-12-29 NOTE — ASSESSMENT
[FreeTextEntry1] : This is a 46-year-old male that is now 2 weeks status post left L2-L3 microdiscectomy. He has done very well since surgery. He feels like his left lower extremity radicular type symptoms have been improving. He feels like his left leg is stronger. He is still dealing with some mild to moderate cramps in his left lower extremity. The symptoms however are not severely disabling for the patient. Overall he is pleased with his progress as MI. I will have him follow-up in 4 weeks time for repeat clinical evaluation. I encouraged him to reach out sooner if he has any new or worsening symptoms.

## 2021-12-29 NOTE — PHYSICAL EXAM
[de-identified] : Lumbar Physical Exam\par \par Gait - Normal\par \par Station - Normal\par \par Sagittal balance - Normal\par \par Compensatory mechanism? - None\par \par Heel walk - Normal\par \par Toe walk - Normal\par \par Reflexes\par Patellar - normal\par Gastroc - normal\par Clonus - No\par \par Hip Exam - Normal\par \par Straight leg raise - none\par \par Pulses - 2+ dp/pt\par \par Range of motion - normal\par \par Sensation \par Sensation intact to light touch in L1, L2, L3, L4, L5 and S1 dermatomes bilaterally, mild numbness over the left anterior thigh\par \par Motor\par 	IP	Quad	HS	TA	Gastroc	EHL\par Right	5/5	5/5	5/5	5/5	5/5	5/5\par Left	4+/5	4+/5	5/5	5/5	5/5	5/5\par \par The patient states that he feels like his left leg is stronger. He is able to walk up stairs more normally. He still does note some residual weakness on his left side but this is improved as compared to prior to surgery

## 2021-12-30 PROBLEM — M54.16 RADICULOPATHY, LUMBAR REGION: Chronic | Status: ACTIVE | Noted: 2021-12-08

## 2021-12-30 PROBLEM — M51.26 OTHER INTERVERTEBRAL DISC DISPLACEMENT, LUMBAR REGION: Chronic | Status: ACTIVE | Noted: 2021-12-08

## 2022-01-02 ENCOUNTER — TRANSCRIPTION ENCOUNTER (OUTPATIENT)
Age: 47
End: 2022-01-02

## 2022-01-31 ENCOUNTER — APPOINTMENT (OUTPATIENT)
Dept: ORTHOPEDIC SURGERY | Facility: CLINIC | Age: 47
End: 2022-01-31
Payer: COMMERCIAL

## 2022-01-31 VITALS — HEIGHT: 68 IN | BODY MASS INDEX: 26.52 KG/M2 | WEIGHT: 175 LBS

## 2022-01-31 PROCEDURE — 99024 POSTOP FOLLOW-UP VISIT: CPT

## 2022-01-31 NOTE — PHYSICAL EXAM
[de-identified] : Lumbar Physical Exam\par \par Gait - Normal\par \par Station - Normal\par \par Sagittal balance - Normal\par \par Compensatory mechanism? - None\par \par Heel walk - Normal\par \par Toe walk - Normal\par \par Reflexes\par Patellar - normal\par Gastroc - normal\par Clonus - No\par \par Hip Exam - Normal\par \par Straight leg raise - none\par \par Pulses - 2+ dp/pt\par \par Range of motion - normal\par \par Sensation \par Sensation intact to light touch in L1, L2, L3, L4, L5 and S1 dermatomes bilaterally, mild numbness over the left anterior thigh\par \par Motor\par 	IP	Quad	HS	TA	Gastroc	EHL\par Right	5/5	5/5	5/5	5/5	5/5	5/5\par Left	5/5	5/5	5/5	5/5	5/5	5/5\par \par I could not break the patient's left leg with any range of motion testing.  The patient states that he feels like his left leg is stronger. He is able to walk up stairs more normally.

## 2022-01-31 NOTE — HISTORY OF PRESENT ILLNESS
[de-identified] : Today the patient is 6 weeks out from surgery.  Overall he is progressing very well.  He denies any severe radicular type symptoms down his legs.  He does note some slight weakness on his left leg as compared to his right but he does feel this is improving.  He denies any bowel bladder issues.  He denies any saddle anesthesia.\par \par 12/29/21\par Today the patient states that he has been feeling like his left leg is stronger now that he is now 2 weeks status post microdiscectomy. He states that he has no severe radicular type symptoms. He does have some mild to moderate left leg cramping but this is currently controlled with medical medications. He has no bowel bladder issues. He has no saddle anesthesia. He is off all pain medications besides an occasional tramadol. Overall he is very pleased with his recovery to date.\par \par 12/08/21\par Today the patient states that he still dealing with back and left lower extremity symptoms.  Fortunately he has had no worsening of his weakness.  He has had no issues with bowel bladder incontinence.  He has no saddle anesthesia.  He does still have numbness over his left anterior lateral thigh.  He is still having radicular type symptoms down his left leg as well.\par \par 12/03/21\par This is a 46-year-old male that is here today for evaluation of his back and left lower extremity symptoms.  He does have pain which radiates down his left anterior lateral thigh.  His symptoms are above his knee.  He does feel diffusely weaker in his left leg.  The symptoms been ongoing and worsening over the past 4 weeks.  He has been previously treated with epidural steroid injection as well as oral steroids.  Unfortunately symptoms have persisted.  He does have a previous history of an L3-L4 microdiscectomy which she did well from.  He is here today to discuss his treatment options.  He denies any bowel bladder issues.  He denies any saddle anesthesia.

## 2022-01-31 NOTE — ASSESSMENT
[FreeTextEntry1] : The patient is now 6 weeks out from surgery.  He has done very well.  I am pleased with his recovery to date and so is the patient.  He has no radicular type symptoms.  I would like him to start some physical therapy exercises to help regain his strength in his left leg.  All questions were answered.  I did encourage him to reach out to me at any point if he has any new or worsening symptoms.

## 2022-03-11 ENCOUNTER — APPOINTMENT (OUTPATIENT)
Dept: ORTHOPEDIC SURGERY | Facility: CLINIC | Age: 47
End: 2022-03-11
Payer: COMMERCIAL

## 2022-03-11 VITALS
DIASTOLIC BLOOD PRESSURE: 80 MMHG | HEART RATE: 96 BPM | HEIGHT: 68 IN | BODY MASS INDEX: 26.52 KG/M2 | WEIGHT: 175 LBS | SYSTOLIC BLOOD PRESSURE: 115 MMHG

## 2022-03-11 PROCEDURE — 99024 POSTOP FOLLOW-UP VISIT: CPT

## 2022-03-11 NOTE — HISTORY OF PRESENT ILLNESS
[de-identified] : 48 yo male following up 3 months post op for L2-L3 microdescetomy. He has been in physical therapy and doing well until 1.5 weeks ago when he noted increased low back pain with left anterior/lateral thigh numbness. He does say that he has been more active lately and thought it was fatigue. Denies any bowel bladder or saddle anethesia.

## 2022-03-11 NOTE — ASSESSMENT
[FreeTextEntry1] : Today the patient is now approximately 3 months out from lumbar decompression surgery.  He has had a return of a portion of his left lumbar radicular type symptoms.  At this point I would like to proceed with a lumbar MRI with and without contrast noted to ensure he has no recurrent disc herniation.  The patient was agreement this plan.  I will have him follow-up in approximately 2 weeks for repeat clinical evaluation.  I did give him my direct contact information and encouraged him to reach out to me at any time if he has any new or worsening symptoms

## 2022-03-11 NOTE — PHYSICAL EXAM
[de-identified] : Lumbar Physical Exam\par \par Gait - Normal\par \par Station - Normal\par \par Sagittal balance - Normal\par \par Compensatory mechanism? - None\par \par Heel walk - Normal\par \par Toe walk - Normal\par \par Reflexes\par Patellar - normal\par Gastroc - normal\par Clonus - No\par \par Hip Exam - Normal\par \par Straight leg raise - none\par \par Pulses - 2+ dp/pt\par \par Range of motion - normal\par \par Sensation \par Sensation intact to light touch in L1, L2, L3, L4, L5 and S1 dermatomes bilaterally, mild numbness over the left anterior thigh\par \par Motor\par 	IP	Quad	HS	TA	Gastroc	EHL\par Right	5/5	5/5	5/5	5/5	5/5	5/5\par Left	4/5	5/5	5/5	5/5	5/5	5/5\par

## 2022-03-11 NOTE — REASON FOR VISIT
[Post Operative Visit] : a post operative visit for [Herniated Discs] : herniated discs [Back Pain] : back pain [Radiculopathy] : radiculopathy

## 2022-04-01 ENCOUNTER — APPOINTMENT (OUTPATIENT)
Dept: ORTHOPEDIC SURGERY | Facility: CLINIC | Age: 47
End: 2022-04-01
Payer: COMMERCIAL

## 2022-04-01 VITALS — BODY MASS INDEX: 26.52 KG/M2 | WEIGHT: 175 LBS | HEIGHT: 68 IN

## 2022-04-01 DIAGNOSIS — M54.16 RADICULOPATHY, LUMBAR REGION: ICD-10-CM

## 2022-04-01 PROCEDURE — 99214 OFFICE O/P EST MOD 30 MIN: CPT

## 2022-04-01 NOTE — PHYSICAL EXAM
[de-identified] : Lumbar Physical Exam\par \par Gait - Normal\par \par Station - Normal\par \par Sagittal balance - Normal\par \par Compensatory mechanism? - None\par \par Heel walk - Normal\par \par Toe walk - Normal\par \par Reflexes\par Patellar - normal\par Gastroc - normal\par Clonus - No\par \par Hip Exam - Normal\par \par Straight leg raise - none\par \par Pulses - 2+ dp/pt\par \par Range of motion - normal\par \par Sensation \par Sensation intact to light touch in L1, L2, L3, L4, L5 and S1 dermatomes bilaterally, mild numbness over the left anterior thigh\par \par Motor\par 	IP	Quad	HS	TA	Gastroc	EHL\par Right	5/5	5/5	5/5	5/5	5/5	5/5\par Left	4/5	5/5	5/5	5/5	5/5	5/5\par  [de-identified] : Lumbar MRI reviewed\par L2-L3 left-sided disc herniation with impingement on the exiting L2 nerve root as well as the descending L3 nerve root\par \par Lumbar radiographs\par Significant disc degeneration noted\par Disc height loss noted\par \par New MRI reviewed\par I do not note any areas of recurrent disc herniation at L2-L3 or any other level.\par Hemilaminotomy site present without complication\par No fluid collection

## 2022-04-01 NOTE — REASON FOR VISIT
[Follow-Up Visit] : a follow-up visit for [Back Pain] : back pain [Radiculopathy] : radiculopathy [FreeTextEntry2] : poa

## 2022-04-01 NOTE — HISTORY OF PRESENT ILLNESS
[de-identified] : Today the patient is now approximately 3 months out from surgery.  He has had a return of some of his radicular symptoms down his left leg.  As result we did get an MRI which did not show any recurrent disc herniation or any severe stenosis or any substantial stenosis.  The patient has already seen his pain management physician who has been giving him injections as needed.\par \par 01/2022\par Today the patient is 6 weeks out from surgery.  Overall he is progressing very well.  He denies any severe radicular type symptoms down his legs.  He does note some slight weakness on his left leg as compared to his right but he does feel this is improving.  He denies any bowel bladder issues.  He denies any saddle anesthesia.\par \par 12/29/21\par Today the patient states that he has been feeling like his left leg is stronger now that he is now 2 weeks status post microdiscectomy. He states that he has no severe radicular type symptoms. He does have some mild to moderate left leg cramping but this is currently controlled with medical medications. He has no bowel bladder issues. He has no saddle anesthesia. He is off all pain medications besides an occasional tramadol. Overall he is very pleased with his recovery to date.\par \par 12/08/21\par Today the patient states that he still dealing with back and left lower extremity symptoms.  Fortunately he has had no worsening of his weakness.  He has had no issues with bowel bladder incontinence.  He has no saddle anesthesia.  He does still have numbness over his left anterior lateral thigh.  He is still having radicular type symptoms down his left leg as well.\par \par 12/03/21\par This is a 46-year-old male that is here today for evaluation of his back and left lower extremity symptoms.  He does have pain which radiates down his left anterior lateral thigh.  His symptoms are above his knee.  He does feel diffusely weaker in his left leg.  The symptoms been ongoing and worsening over the past 4 weeks.  He has been previously treated with epidural steroid injection as well as oral steroids.  Unfortunately symptoms have persisted.  He does have a previous history of an L3-L4 microdiscectomy which she did well from.  He is here today to discuss his treatment options.  He denies any bowel bladder issues.  He denies any saddle anesthesia.

## 2022-04-01 NOTE — ASSESSMENT
[FreeTextEntry1] : I had a long discussion with the patient regards to his treatment plan and diagnosis.  At this point I would say that some of his symptoms may be from scar tissue.  I think proceeding with a course of injections is the right move.  I would like to keep a close clinical on the patient.  I will have him follow-up in 6 to 8 weeks.  I also encouraged to reach out to me directly at any point if he has any new or worsening symptoms.

## 2022-05-09 ENCOUNTER — NON-APPOINTMENT (OUTPATIENT)
Age: 47
End: 2022-05-09

## 2022-05-20 ENCOUNTER — APPOINTMENT (OUTPATIENT)
Dept: ORTHOPEDIC SURGERY | Facility: CLINIC | Age: 47
End: 2022-05-20

## 2022-10-01 ENCOUNTER — EMERGENCY (EMERGENCY)
Facility: HOSPITAL | Age: 47
LOS: 1 days | Discharge: HOME | End: 2022-10-01
Attending: EMERGENCY MEDICINE | Admitting: EMERGENCY MEDICINE
Payer: COMMERCIAL

## 2022-10-01 VITALS
HEIGHT: 68 IN | TEMPERATURE: 98 F | SYSTOLIC BLOOD PRESSURE: 117 MMHG | RESPIRATION RATE: 18 BRPM | WEIGHT: 179.9 LBS | OXYGEN SATURATION: 99 % | HEART RATE: 103 BPM | DIASTOLIC BLOOD PRESSURE: 79 MMHG

## 2022-10-01 VITALS
OXYGEN SATURATION: 99 % | HEART RATE: 100 BPM | RESPIRATION RATE: 18 BRPM | SYSTOLIC BLOOD PRESSURE: 111 MMHG | DIASTOLIC BLOOD PRESSURE: 67 MMHG

## 2022-10-01 DIAGNOSIS — Z98.890 OTHER SPECIFIED POSTPROCEDURAL STATES: Chronic | ICD-10-CM

## 2022-10-01 LAB
ALBUMIN SERPL ELPH-MCNC: 3.7 G/DL — SIGNIFICANT CHANGE UP (ref 3.3–5)
ALP SERPL-CCNC: 73 U/L — SIGNIFICANT CHANGE UP (ref 40–120)
ALT FLD-CCNC: 28 U/L — SIGNIFICANT CHANGE UP (ref 12–78)
ANION GAP SERPL CALC-SCNC: 4 MMOL/L — LOW (ref 5–17)
ANISOCYTOSIS BLD QL: SLIGHT — SIGNIFICANT CHANGE UP
APTT BLD: 27.4 SEC — LOW (ref 27.5–35.5)
AST SERPL-CCNC: 21 U/L — SIGNIFICANT CHANGE UP (ref 15–37)
BASOPHILS # BLD AUTO: 0.03 K/UL — SIGNIFICANT CHANGE UP (ref 0–0.2)
BASOPHILS # BLD AUTO: 0.03 K/UL — SIGNIFICANT CHANGE UP (ref 0–0.2)
BASOPHILS NFR BLD AUTO: 0.4 % — SIGNIFICANT CHANGE UP (ref 0–2)
BASOPHILS NFR BLD AUTO: 0.5 % — SIGNIFICANT CHANGE UP (ref 0–2)
BILIRUB SERPL-MCNC: 0.3 MG/DL — SIGNIFICANT CHANGE UP (ref 0.2–1.2)
BLD GP AB SCN SERPL QL: SIGNIFICANT CHANGE UP
BUN SERPL-MCNC: 15 MG/DL — SIGNIFICANT CHANGE UP (ref 7–23)
CALCIUM SERPL-MCNC: 8.6 MG/DL — SIGNIFICANT CHANGE UP (ref 8.5–10.1)
CHLORIDE SERPL-SCNC: 111 MMOL/L — HIGH (ref 96–108)
CO2 SERPL-SCNC: 29 MMOL/L — SIGNIFICANT CHANGE UP (ref 22–31)
CREAT SERPL-MCNC: 0.89 MG/DL — SIGNIFICANT CHANGE UP (ref 0.5–1.3)
EGFR: 108 ML/MIN/1.73M2 — SIGNIFICANT CHANGE UP
EOSINOPHIL # BLD AUTO: 0 K/UL — SIGNIFICANT CHANGE UP (ref 0–0.5)
EOSINOPHIL # BLD AUTO: 0.07 K/UL — SIGNIFICANT CHANGE UP (ref 0–0.5)
EOSINOPHIL NFR BLD AUTO: 0 % — SIGNIFICANT CHANGE UP (ref 0–6)
EOSINOPHIL NFR BLD AUTO: 1.2 % — SIGNIFICANT CHANGE UP (ref 0–6)
GLUCOSE SERPL-MCNC: 101 MG/DL — HIGH (ref 70–99)
HCT VFR BLD CALC: 28.4 % — LOW (ref 39–50)
HCT VFR BLD CALC: 33.6 % — LOW (ref 39–50)
HGB BLD-MCNC: 8.1 G/DL — LOW (ref 13–17)
HGB BLD-MCNC: 9.6 G/DL — LOW (ref 13–17)
IMM GRANULOCYTES NFR BLD AUTO: 0.3 % — SIGNIFICANT CHANGE UP (ref 0–0.9)
IMM GRANULOCYTES NFR BLD AUTO: 0.3 % — SIGNIFICANT CHANGE UP (ref 0–0.9)
INR BLD: 1.15 RATIO — SIGNIFICANT CHANGE UP (ref 0.88–1.16)
LYMPHOCYTES # BLD AUTO: 0.75 K/UL — LOW (ref 1–3.3)
LYMPHOCYTES # BLD AUTO: 0.77 K/UL — LOW (ref 1–3.3)
LYMPHOCYTES # BLD AUTO: 12.6 % — LOW (ref 13–44)
LYMPHOCYTES # BLD AUTO: 9.9 % — LOW (ref 13–44)
MACROCYTES BLD QL: SLIGHT — SIGNIFICANT CHANGE UP
MCHC RBC-ENTMCNC: 21 PG — LOW (ref 27–34)
MCHC RBC-ENTMCNC: 21.1 PG — LOW (ref 27–34)
MCHC RBC-ENTMCNC: 28.5 GM/DL — LOW (ref 32–36)
MCHC RBC-ENTMCNC: 28.6 GM/DL — LOW (ref 32–36)
MCV RBC AUTO: 73.5 FL — LOW (ref 80–100)
MCV RBC AUTO: 74 FL — LOW (ref 80–100)
MICROCYTES BLD QL: SLIGHT — SIGNIFICANT CHANGE UP
MONOCYTES # BLD AUTO: 0.41 K/UL — SIGNIFICANT CHANGE UP (ref 0–0.9)
MONOCYTES # BLD AUTO: 0.52 K/UL — SIGNIFICANT CHANGE UP (ref 0–0.9)
MONOCYTES NFR BLD AUTO: 5.3 % — SIGNIFICANT CHANGE UP (ref 2–14)
MONOCYTES NFR BLD AUTO: 8.8 % — SIGNIFICANT CHANGE UP (ref 2–14)
NEUTROPHILS # BLD AUTO: 4.54 K/UL — SIGNIFICANT CHANGE UP (ref 1.8–7.4)
NEUTROPHILS # BLD AUTO: 6.52 K/UL — SIGNIFICANT CHANGE UP (ref 1.8–7.4)
NEUTROPHILS NFR BLD AUTO: 76.6 % — SIGNIFICANT CHANGE UP (ref 43–77)
NEUTROPHILS NFR BLD AUTO: 84.1 % — HIGH (ref 43–77)
NRBC # BLD: 0 /100 WBCS — SIGNIFICANT CHANGE UP (ref 0–0)
NRBC # BLD: 0 /100 WBCS — SIGNIFICANT CHANGE UP (ref 0–0)
PLAT MORPH BLD: NORMAL — SIGNIFICANT CHANGE UP
PLATELET # BLD AUTO: 254 K/UL — SIGNIFICANT CHANGE UP (ref 150–400)
PLATELET # BLD AUTO: 293 K/UL — SIGNIFICANT CHANGE UP (ref 150–400)
POTASSIUM SERPL-MCNC: 4 MMOL/L — SIGNIFICANT CHANGE UP (ref 3.5–5.3)
POTASSIUM SERPL-SCNC: 4 MMOL/L — SIGNIFICANT CHANGE UP (ref 3.5–5.3)
PROT SERPL-MCNC: 6.6 G/DL — SIGNIFICANT CHANGE UP (ref 6–8.3)
PROTHROM AB SERPL-ACNC: 13.5 SEC — HIGH (ref 10.5–13.4)
RBC # BLD: 3.84 M/UL — LOW (ref 4.2–5.8)
RBC # BLD: 4.57 M/UL — SIGNIFICANT CHANGE UP (ref 4.2–5.8)
RBC # FLD: 18.6 % — HIGH (ref 10.3–14.5)
RBC # FLD: 18.7 % — HIGH (ref 10.3–14.5)
RBC BLD AUTO: SIGNIFICANT CHANGE UP
SARS-COV-2 RNA SPEC QL NAA+PROBE: SIGNIFICANT CHANGE UP
SODIUM SERPL-SCNC: 144 MMOL/L — SIGNIFICANT CHANGE UP (ref 135–145)
TROPONIN I, HIGH SENSITIVITY RESULT: 4.5 NG/L — SIGNIFICANT CHANGE UP
WBC # BLD: 5.93 K/UL — SIGNIFICANT CHANGE UP (ref 3.8–10.5)
WBC # BLD: 7.75 K/UL — SIGNIFICANT CHANGE UP (ref 3.8–10.5)
WBC # FLD AUTO: 5.93 K/UL — SIGNIFICANT CHANGE UP (ref 3.8–10.5)
WBC # FLD AUTO: 7.75 K/UL — SIGNIFICANT CHANGE UP (ref 3.8–10.5)

## 2022-10-01 PROCEDURE — 93005 ELECTROCARDIOGRAM TRACING: CPT

## 2022-10-01 PROCEDURE — 99285 EMERGENCY DEPT VISIT HI MDM: CPT

## 2022-10-01 PROCEDURE — 86900 BLOOD TYPING SEROLOGIC ABO: CPT

## 2022-10-01 PROCEDURE — 86850 RBC ANTIBODY SCREEN: CPT

## 2022-10-01 PROCEDURE — 86901 BLOOD TYPING SEROLOGIC RH(D): CPT

## 2022-10-01 PROCEDURE — 96361 HYDRATE IV INFUSION ADD-ON: CPT

## 2022-10-01 PROCEDURE — 87635 SARS-COV-2 COVID-19 AMP PRB: CPT

## 2022-10-01 PROCEDURE — 85730 THROMBOPLASTIN TIME PARTIAL: CPT

## 2022-10-01 PROCEDURE — 80053 COMPREHEN METABOLIC PANEL: CPT

## 2022-10-01 PROCEDURE — 71045 X-RAY EXAM CHEST 1 VIEW: CPT

## 2022-10-01 PROCEDURE — 71045 X-RAY EXAM CHEST 1 VIEW: CPT | Mod: 26

## 2022-10-01 PROCEDURE — 84484 ASSAY OF TROPONIN QUANT: CPT

## 2022-10-01 PROCEDURE — 99285 EMERGENCY DEPT VISIT HI MDM: CPT | Mod: 25

## 2022-10-01 PROCEDURE — 85610 PROTHROMBIN TIME: CPT

## 2022-10-01 PROCEDURE — 96360 HYDRATION IV INFUSION INIT: CPT

## 2022-10-01 PROCEDURE — 36415 COLL VENOUS BLD VENIPUNCTURE: CPT

## 2022-10-01 PROCEDURE — 93010 ELECTROCARDIOGRAM REPORT: CPT

## 2022-10-01 PROCEDURE — 85025 COMPLETE CBC W/AUTO DIFF WBC: CPT

## 2022-10-01 RX ORDER — SODIUM CHLORIDE 9 MG/ML
1000 INJECTION INTRAMUSCULAR; INTRAVENOUS; SUBCUTANEOUS ONCE
Refills: 0 | Status: COMPLETED | OUTPATIENT
Start: 2022-10-01 | End: 2022-10-01

## 2022-10-01 RX ORDER — HYDROCORTISONE 1 %
1 OINTMENT (GRAM) TOPICAL
Qty: 10 | Refills: 0
Start: 2022-10-01 | End: 2022-10-05

## 2022-10-01 RX ADMIN — SODIUM CHLORIDE 1000 MILLILITER(S): 9 INJECTION INTRAMUSCULAR; INTRAVENOUS; SUBCUTANEOUS at 09:13

## 2022-10-01 RX ADMIN — SODIUM CHLORIDE 1000 MILLILITER(S): 9 INJECTION INTRAMUSCULAR; INTRAVENOUS; SUBCUTANEOUS at 11:31

## 2022-10-01 NOTE — ED PROVIDER NOTE - CARE PROVIDERS DIRECT ADDRESSES
[email or web address]

## 2022-10-01 NOTE — CONSULT NOTE ADULT - ASSESSMENT
brbpr  hemorrhoids s/p banding    plan  in and out  high fiber  add hydrcortisone suppository  decision not to examine as this may cause bleeding  ppi once a day  d/c planning and followm up as outpt with colorectal    Advanced care planning was discussed with patient and family.  Advanced care planning forms were reviewed and discussed.  Risks, benefits and alternatives of gastroenterologic procedures were discussed in detail and all questions were answered.    30 minutes spent.  
47-year-old man without any significant past medical history of concern.  He does not have hypertension, diabetes or hyperlipidemia.  He is not known to have any underlying structural heart disease.    At baseline, he is sedentary.  With regular day to day activities, he feels well, without reproducible chest discomfort or shortness of breath suggestive of angina.  He denies orthopnea, PND and edema.  He denies palpitations and prior episodes of syncope.    He has been suffering from severe hemorrhoidal bleeding.  He had several procedures recently, including a banding procedure 6 weeks ago, and again 2-1/2 weeks ago.  His hemoglobin was 7.3 at its lowest, and when his hemoglobin was that low, he was experiencing a degree of dyspnea with activity and intermittent lightheadedness.  He has been receiving iron infusions, with success.    He presents to the emergency department today after an episode of syncope.  He reports that he was in the bathroom, and had severe hemorrhoidal bleeding.  He reports that the entire bathroom seemingly was filled with blood, in the toilet, on the floor, and on the walls.  He began to experience lightheadedness, with associated diaphoresis.  He appeared very pale.  He sat down, and believes that he syncopized, though he is not sure how long he might of been unconscious. He did not suffer an injury, and he was brought to the emergency department.    In the emergency department he was found to be in a sinus rhythm.  He was not hypotensive though he was mildly tachycardic.  His initial blood work was reassuring.  He is getting fluids and there is a plan to repeat his hemoglobin.  Cardiology consultation is now being obtained.    -there is no evidence of acute ischemia.  -there is no evidence of significant arrhythmia.  -there is no evidence for meaningful  volume overload.     -episode of loc likely on the basis of vagally mediated hypotension as well as anemia/blood loss  -low suspicion that he has any underlying structural heart disease  -follow cbc and transfuse if needed  -can consider outpt testing if he has recurrent episodes suggestive of conduction issue    -DVT prophylaxis  -monitor electrolytes, keep k>4, Mg>2

## 2022-10-01 NOTE — ED PROVIDER NOTE - PATIENT PORTAL LINK FT
You can access the FollowMyHealth Patient Portal offered by [facility] by registering at the following website: [email or web address]. By joining [facility]’s FollowMyHealth portal, you will also be able to view your health information using other applications (apps) compatible with our system.

## 2022-10-01 NOTE — CONSULT NOTE ADULT - SUBJECTIVE AND OBJECTIVE BOX
Chief Complaint:  Patient is a 47y old  Male who presents with a chief complaint of brbpr s/p colonic banding of hemorrhoids  · Chief Complaint: The patient is a 47y Male complaining of syncope.  · HPI Objective Statement: Patient with history of hemorrhoids banded by colorectal surgery 6 weeks ago and then again 2 weeks ago brought in by EMS for syncopal episode.  Patient relates he was in the bathroom had a large amount of rectal bleeding then became dizzy laid himself down on the floor and then passed out.  Patient relates bleeding has stopped at this time.  Patient relates 6 weeks ago his hemoglobin was 7.3 he was started on iron infusions.  Patient denies headache neck pain back pain arm pain leg pain chest pain short of breath abdominal pain focal weakness or numbness or current dizziness.  PMD Doug colorectal Singh  · Presenting Symptoms: SYNCOPE, rectal bleeding  · Negative Findings: no chest pain, no nausea, no shortness of breath, no vomiting  · Timing: sudden onset  · Duration: today  · Context: rectal bleeding  · Aggravated Factors: none  · Relieving Factors: none      Allergies:  No Known Allergies      Medications:      PMHX/PSHX:  Hemorrhoid        Family history:    no uc no cd no celiac disease    Social History:   lives at home no etoh no cigs no ivda    ROS:     General:  No wt loss, fevers, chills, night sweats, fatigue,   Eyes:  Good vision, no reported pain  ENT:  No sore throat, pain, runny nose, dysphagia  CV:  No pain, palpitations, hypo/hypertension  Resp:  No dyspnea, cough, tachypnea, wheezing  GI:  No pain, No nausea, No vomiting, No diarrhea, No constipation, No weight loss, No fever, No pruritis, No rectal bleeding, No tarry stools, No dysphagia,  :  No pain, bleeding, incontinence, nocturia  Muscle:  No pain, weakness  Neuro:  No weakness, tingling, memory problems  Psych:  No fatigue, insomnia, mood problems, depression  Endocrine:  No polyuria, polydipsia, cold/heat intolerance  Heme:  No petechiae, ecchymosis, easy bruisability  Skin:  No rash, tattoos, scars, edema      PHYSICAL EXAM:   Vital Signs:  Vital Signs Last 24 Hrs  T(C): 36.9 (01 Oct 2022 08:43), Max: 36.9 (01 Oct 2022 08:43)  T(F): 98.5 (01 Oct 2022 08:43), Max: 98.5 (01 Oct 2022 08:43)  HR: 103 (01 Oct 2022 08:43) (103 - 103)  BP: 117/79 (01 Oct 2022 08:43) (117/79 - 117/79)  BP(mean): --  RR: 18 (01 Oct 2022 08:43) (18 - 18)  SpO2: 99% (01 Oct 2022 08:43) (99% - 99%)    Parameters below as of 01 Oct 2022 08:43  Patient On (Oxygen Delivery Method): room air      Daily Height in cm: 172.72 (01 Oct 2022 08:43)    Daily     GENERAL:  Appears stated age, well-groomed, well-nourished, no distress  HEENT:  NC/AT,  conjunctivae clear and pink, no thyromegaly, nodules, adenopathy, no JVD, sclera -anicteric  CHEST:  Full & symmetric excursion, no increased effort, breath sounds clear  HEART:  Regular rhythm, S1, S2, no murmur/rub/S3/S4, no abdominal bruit, no edema  ABDOMEN:  Soft, non-tender, non-distended, normoactive bowel sounds,  no masses ,no hepato-splenomegaly, no signs of chronic liver disease  EXTEREMITIES:  no cyanosis,clubbing or edema  SKIN:  No rash/erythema/ecchymoses/petechiae/wounds/abscess/warm/dry  NEURO:  Alert, oriented, no asterixis, no tremor, no encephalopathy    LABS:                        8.1    7.75  )-----------( 254      ( 01 Oct 2022 13:00 )             28.4     10-01    144  |  111<H>  |  15  ----------------------------<  101<H>  4.0   |  29  |  0.89    Ca    8.6      01 Oct 2022 09:10    TPro  6.6  /  Alb  3.7  /  TBili  0.3  /  DBili  x   /  AST  21  /  ALT  28  /  AlkPhos  73  10-01    LIVER FUNCTIONS - ( 01 Oct 2022 09:10 )  Alb: 3.7 g/dL / Pro: 6.6 g/dL / ALK PHOS: 73 U/L / ALT: 28 U/L / AST: 21 U/L / GGT: x           PT/INR - ( 01 Oct 2022 09:10 )   PT: 13.5 sec;   INR: 1.15 ratio         PTT - ( 01 Oct 2022 09:10 )  PTT:27.4 sec        Imaging:

## 2022-10-01 NOTE — ED ADULT NURSE NOTE - NS ED NOTE  TALK SOMEONE YN
PROGRESS NOTE



Anna is a 63-year-old lady who is admitted to hospital with perforated bowel and

underwent surgery for the same.  We have been involved in her care secondary to apical

ballooning syndrome.  Her echocardiogram shows poor LV function.  At the time of my

evaluation this morning, she is intubated on vent.



On exam, heart rate is 80 beats per minute.  Blood pressure is 96/69.  Respiratory rate

is 18.  Chest exam reveals good air entry bilaterally.  Heart exam reveals first and

second heart sounds.  No gallop.  Abdomen:  Soft.  Exam of extremities did not reveal

any edema.  Peripheral pulses are felt.



Labs show a hemoglobin of 11.8, platelet count is 247.  Potassium is 4.  Creatinine is

0.4.



The patient is currently on Coreg, digoxin, and Aldactone 50 mg daily.



An echocardiogram on this admission revealed an ejection fraction of 30-35 percent.

The patient is not on ACE inhibitors secondary to hypotension.



ASSESSMENT:

1. 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

2. History of gastric bypass.

3. Acute onset systolic heart failure.



PLAN:

I am going to decrease the dose of Aldactone to 25 mg daily.  If the blood pressure

improves, start her on a small dose of lisinopril.





MMODL / IJN: [id number] / Job#: [id number] No

## 2022-10-01 NOTE — ED PROVIDER NOTE - PROGRESS NOTE DETAILS
peri (michael) seen eval pt, cleared for d/c and outpt f/u tiarra (gi) seen eval pt, no active rectal bleeding, he requests d/c with anusol hc bid x 5 days and f/u with his colorectal on monday (pt has appt already) Results discussed with Judi, he cleared for discharge and outpatient follow-up. Reevaluated patient at bedside.  Patient feeling well, no bleeding in er.  Discussed the results of all diagnostic testing in ED and copies of all reports given.   An opportunity to ask questions was given.  Discussed the importance of prompt, close medical follow-up.  Patient will return with any changes, concerns or persistent / worsening symptoms.  Understanding of all instructions verbalized.

## 2022-10-01 NOTE — ED ADULT NURSE NOTE - CHIEF COMPLAINT QUOTE
Patient is a 48yo male complaining of syncopal episode today. Patient had hemeroid surgery 2 weeks ago and had a band in that snake and patient had hemorrhage that has stop since then. Patient goes for Iron infusions

## 2022-10-01 NOTE — ED ADULT NURSE NOTE - OBJECTIVE STATEMENT
patient BIBA s/p syncopal episode @ home. pt with recent hemorrorhoids "banded" and reports today when he bent down he felt a gush of blood and it was "pouring out of him." states he then saw the blood and became lightheaded and lowered self to ground prior to syncopal episode. denies cardiac hx. sinus tach on bedside monitor. reports he thinks the bleeding has stopped at this time. dr. hung @ bedside.

## 2022-10-01 NOTE — ED PROVIDER NOTE - PROVIDER TOKENS
PROVIDER:[TOKEN:[[id number]],FOLLOWUP:[1-3 Days]] PROVIDER:[TOKEN:[[id number]],FOLLOWUP:[1-3 Days]],PROVIDER:[TOKEN:[[id number]],FOLLOWUP:[1-3 Days]],PROVIDER:[TOKEN:[[id number]],FOLLOWUP:[1-3 Days]]

## 2022-10-01 NOTE — ED ADULT TRIAGE NOTE - CHIEF COMPLAINT QUOTE
Patient is a 46yo male complaining of syncopal episode today. Patient had hemeroid surgery 2 weeks ago and had a band in that snake and patient had hemorrhage that has stop since then. Patient goes for Iron infusions

## 2022-10-01 NOTE — ED PROVIDER NOTE - CLINICAL SUMMARY MEDICAL DECISION MAKING FREE TEXT BOX
Patient with history of hemorrhoids banded by colorectal surgery 6 weeks ago and then again 2 weeks ago brought in by EMS for syncopal episode.  Patient relates he was in the bathroom had a large amount of rectal bleeding then became dizzy laid himself down on the floor and then passed out.  Patient relates bleeding has stopped at this time.  Patient relates 6 weeks ago his hemoglobin was 7.3 he was started on iron infusions.  Patient denies headache neck pain back pain arm pain leg pain chest pain short of breath abdominal pain focal weakness or numbness or current dizziness.  Plan EKG labs x-ray orthostatics IV fluid cardiology and gi consults

## 2022-10-01 NOTE — CONSULT NOTE ADULT - SUBJECTIVE AND OBJECTIVE BOX
[facility]         Jesu Metz Pannella, Patel, Savella        [phone] (office)    CHIEF COMPLAINT: Patient is a 47y old  Male who presents with a chief complaint of     HPI:  The patient is a 47-year-old man without any significant past medical history of concern.  He does not have hypertension, diabetes or hyperlipidemia.  He is not known to have any underlying structural heart disease.    At baseline, he is sedentary.  With regular day to day activities, he feels well, without reproducible chest discomfort or shortness of breath suggestive of angina.  He denies orthopnea, PND and edema.  He denies palpitations and prior episodes of syncope.    He has been suffering with severe hemorrhoidal bleeding.  He had several procedures recently, including a banding procedure 6 weeks ago, and again 2-1/2 weeks ago.  His hemoglobin was 7.3 at its lowest, and when his hemoglobin was that low, he was experiencing a degree of dyspnea with activity and intermittent lightheadedness.  He has been receiving iron infusions, with success.    He presents to the emergency department today after an episode of syncope.  He reports that he was in the bathroom, and had severe hemorrhoidal bleeding.  He reports that the entire bathroom seemingly was filled with blood, in the toilet, on the floor, and on the walls.  She began to experience lightheadedness, with associated diaphoresis.  He appeared very pale.  He sat down, and believes that he syncopized, though he is not sure how long he might of been unconscious.,  But he is not sure how long he might of been unconscious.  He did not suffer an injury, and he was brought to the emergency department.    In the emergency department he was found to be in a sinus rhythm.  He was not hypotensive or tachycardic.  His initial blood work was reassuring.  He is getting fluids and there is a plan to repeat his hemoglobin.  Cardiology consultation is now being obtained.          PAST MEDICAL & SURGICAL HISTORY:  Hemorrhoid          SOCIAL HISTORY: No active tobacco, alcohol or illicit drug use    FAMILY HISTORY:   No pertinent family history of CAD    Outpatient medications:    MEDICATIONS  (STANDING):  sodium chloride 0.9% Bolus 1000 milliLiter(s) IV Bolus once    MEDICATIONS  (PRN):      Allergies    No Known Allergies    Intolerances        REVIEW OF SYSTEMS: Is negative for eye, ENT, GI, , allergic, dermatologic, musculoskeletal and neurologic, except as described above.    VITAL SIGNS:   Vital Signs Last 24 Hrs  T(C): 36.9 (01 Oct 2022 08:43), Max: 36.9 (01 Oct 2022 08:43)  T(F): 98.5 (01 Oct 2022 08:43), Max: 98.5 (01 Oct 2022 08:43)  HR: 103 (01 Oct 2022 08:43) (103 - 103)  BP: 117/79 (01 Oct 2022 08:43) (117/79 - 117/79)  BP(mean): --  RR: 18 (01 Oct 2022 08:43) (18 - 18)  SpO2: 99% (01 Oct 2022 08:43) (99% - 99%)    Parameters below as of 01 Oct 2022 08:43  Patient On (Oxygen Delivery Method): room air        I&O's Summary      PHYSICAL EXAM:    Constitutional: NAD, awake and alert, well-developed  Eyes:  EOMI, no oral cyanosis, conjunctivae clear, anicteric.  Pulmonary: Non-labored, breath sounds are clear bilaterally, no wheezing, rales or rhonchi  Cardiovascular:  regular S1 and S2. No murmur.  No rubs, gallops or clicks  Gastrointestinal: Bowel Sounds present, soft, nontender.   Lymph: No peripheral edema.   Neurological: Alert, strength and sensitivity are grossly intact  Skin: No obvious lesions/rashes.   Psych:  Mood & affect appropriate .    LABS: All Labs Reviewed:                        9.6    5.93  )-----------( 293      ( 01 Oct 2022 09:10 )             33.6     01 Oct 2022 09:10    144    |  111    |  15     ----------------------------<  101    4.0     |  29     |  0.89     Ca    8.6        01 Oct 2022 09:10    TPro  6.6    /  Alb  3.7    /  TBili  0.3    /  DBili  x      /  AST  21     /  ALT  28     /  AlkPhos  73     01 Oct 2022 09:10    PT/INR - ( 01 Oct 2022 09:10 )   PT: 13.5 sec;   INR: 1.15 ratio         PTT - ( 01 Oct 2022 09:10 )  PTT:27.4 sec      Blood Culture:         RADIOLOGY:    EKG:    Impression/Plan: [facility]         Jesu Metz Pannella, Patel, Savella        [phone] (office)    CHIEF COMPLAINT: Patient is a 47y old  Male who presents with a chief complaint of     HPI:  The patient is a 47-year-old man without any significant past medical history of concern.  He does not have hypertension, diabetes or hyperlipidemia.  He is not known to have any underlying structural heart disease.    At baseline, he is sedentary.  With regular day to day activities, he feels well, without reproducible chest discomfort or shortness of breath suggestive of angina.  He denies orthopnea, PND and edema.  He denies palpitations and prior episodes of syncope.    He has been suffering from severe hemorrhoidal bleeding.  He had several procedures recently, including a banding procedure 6 weeks ago, and again 2-1/2 weeks ago.  His hemoglobin was 7.3 at its lowest, and when his hemoglobin was that low, he was experiencing a degree of dyspnea with activity and intermittent lightheadedness.  He has been receiving iron infusions, with success.    He presents to the emergency department today after an episode of syncope.  He reports that he was in the bathroom, and had severe hemorrhoidal bleeding.  He reports that the entire bathroom seemingly was filled with blood, in the toilet, on the floor, and on the walls.  He began to experience lightheadedness, with associated diaphoresis.  He appeared very pale.  He sat down, and believes that he syncopized, though he is not sure how long he might of been unconscious. He did not suffer an injury, and he was brought to the emergency department.    In the emergency department he was found to be in a sinus rhythm.  He was not hypotensive though he was mildly tachycardic.  His initial blood work was reassuring.  He is getting fluids and there is a plan to repeat his hemoglobin.  Cardiology consultation is now being obtained.          PAST MEDICAL & SURGICAL HISTORY:  Hemorrhoid          SOCIAL HISTORY: No active tobacco, alcohol or illicit drug use    FAMILY HISTORY:   No pertinent family history of CAD    Outpatient medications:  fe infusions, cymbalta    MEDICATIONS  (STANDING):  sodium chloride 0.9% Bolus 1000 milliLiter(s) IV Bolus once    MEDICATIONS  (PRN):      Allergies    No Known Allergies    Intolerances        REVIEW OF SYSTEMS: Is negative for eye, ENT, GI, , allergic, dermatologic, musculoskeletal and neurologic, except as described above.    VITAL SIGNS:   Vital Signs Last 24 Hrs  T(C): 36.9 (01 Oct 2022 08:43), Max: 36.9 (01 Oct 2022 08:43)  T(F): 98.5 (01 Oct 2022 08:43), Max: 98.5 (01 Oct 2022 08:43)  HR: 103 (01 Oct 2022 08:43) (103 - 103)  BP: 117/79 (01 Oct 2022 08:43) (117/79 - 117/79)  BP(mean): --  RR: 18 (01 Oct 2022 08:43) (18 - 18)  SpO2: 99% (01 Oct 2022 08:43) (99% - 99%)    Parameters below as of 01 Oct 2022 08:43  Patient On (Oxygen Delivery Method): room air        I&O's Summary      PHYSICAL EXAM:    Constitutional: NAD, awake and alert, well-developed  Eyes:  EOMI, no oral cyanosis, conjunctivae clear, anicteric.  Pulmonary: Non-labored, breath sounds are clear bilaterally, no wheezing, rales or rhonchi  Cardiovascular:  regular S1 and S2. No murmur.  No rubs, gallops or clicks  Gastrointestinal: Bowel Sounds present, soft, nontender.   Lymph: No peripheral edema.   Neurological: Alert, strength and sensitivity are grossly intact  Skin: No obvious lesions/rashes.   Psych:  Mood & affect appropriate .    LABS: All Labs Reviewed:                        9.6    5.93  )-----------( 293      ( 01 Oct 2022 09:10 )             33.6     01 Oct 2022 09:10    144    |  111    |  15     ----------------------------<  101    4.0     |  29     |  0.89     Ca    8.6        01 Oct 2022 09:10    TPro  6.6    /  Alb  3.7    /  TBili  0.3    /  DBili  x      /  AST  21     /  ALT  28     /  AlkPhos  73     01 Oct 2022 09:10    PT/INR - ( 01 Oct 2022 09:10 )   PT: 13.5 sec;   INR: 1.15 ratio         PTT - ( 01 Oct 2022 09:10 )  PTT:27.4 sec      Blood Culture:         RADIOLOGY:    EKG: nsr

## 2022-10-01 NOTE — ED PROVIDER NOTE - OBJECTIVE STATEMENT
Patient with history of hemorrhoids banded by colorectal surgery 6 weeks ago and then again 2 weeks ago brought in by EMS for syncopal episode.  Patient relates he was in the bathroom had a large amount of rectal bleeding then became dizzy laid himself down on the floor and then passed out.  Patient relates bleeding has stopped at this time.  Patient relates 6 weeks ago his hemoglobin was 7.3 he was started on iron infusions.  Patient denies headache neck pain back pain arm pain leg pain chest pain short of breath abdominal pain focal weakness or numbness or current dizziness.  PMD Doug colorectal Singh

## 2022-10-01 NOTE — ED PROVIDER NOTE - CARE PROVIDER_API CALL
Brian Nails)  Cardiovascular Disease  [address] [place] [address]  Phone: [phone]  Fax: [phone]  Follow Up Time: 1-3 Days   Brian Nails)  Cardiovascular Disease  [address] [place] [address]  Phone: [phone]  Fax: [phone]  Follow Up Time: 1-3 Days    Jonny Lau ()  Internal Medicine  [address] [place] [address]  Phone: [phone]  Fax: [phone]  Follow Up Time: 1-3 Days    Yony Singh)  ColonRectal Surgery; Surgery  [address] [place] [address]  Phone: [phone]  Fax: [phone]  Follow Up Time: 1-3 Days

## 2022-11-07 ENCOUNTER — NON-APPOINTMENT (OUTPATIENT)
Age: 47
End: 2022-11-07

## 2023-03-17 ENCOUNTER — NON-APPOINTMENT (OUTPATIENT)
Age: 48
End: 2023-03-17

## 2023-05-22 ENCOUNTER — NON-APPOINTMENT (OUTPATIENT)
Age: 48
End: 2023-05-22

## 2023-05-22 PROBLEM — K64.9 UNSPECIFIED HEMORRHOIDS: Chronic | Status: ACTIVE | Noted: 2022-10-01

## 2023-06-07 ENCOUNTER — APPOINTMENT (OUTPATIENT)
Dept: ORTHOPEDIC SURGERY | Facility: CLINIC | Age: 48
End: 2023-06-07
Payer: COMMERCIAL

## 2023-06-07 VITALS
WEIGHT: 175 LBS | DIASTOLIC BLOOD PRESSURE: 83 MMHG | BODY MASS INDEX: 26.52 KG/M2 | HEIGHT: 68 IN | SYSTOLIC BLOOD PRESSURE: 128 MMHG

## 2023-06-07 DIAGNOSIS — M54.16 RADICULOPATHY, LUMBAR REGION: ICD-10-CM

## 2023-06-07 PROCEDURE — 99214 OFFICE O/P EST MOD 30 MIN: CPT

## 2023-06-07 NOTE — PHYSICAL EXAM
[de-identified] : Lumbar Physical Exam\par \par Gait - Normal\par \par Station - Normal\par \par Sagittal balance - Normal\par \par Compensatory mechanism? - None\par \par Heel walk - Normal\par \par Toe walk - Normal\par \par Reflexes\par Patellar - normal\par Gastroc - normal\par Clonus - No\par \par Hip Exam - Normal\par \par Straight leg raise - none\par \par Pulses - 2+ dp/pt\par \par Range of motion - normal\par \par Sensation \par Sensation intact to light touch in L1, L2, L3, L4, L5 and S1 dermatomes bilaterally, mild numbness over the left anterior thigh\par \par Motor\par 	IP	Quad	HS	TA	Gastroc	EHL\par Right	5/5	5/5	5/5	5/5	5/5	5/5\par Left	4/5	5/5	5/5	5/5	5/5	5/5\par  [de-identified] : Lumbar MRI reviewed\par L2-L3 left-sided disc herniation with impingement on the exiting L2 nerve root as well as the descending L3 nerve root\par \par Lumbar radiographs\par Significant disc degeneration noted\par Disc height loss noted\par \par New MRI reviewed\par I do not note any areas of recurrent disc herniation at L2-L3 or any other level.\par Hemilaminotomy site present without complication\par No fluid collection\par \par New lumbar MRI reviewed\par Once again I do not note any critical areas of stenosis over any nerve root at L2-L3\par Hemilaminotomy noted\par No fluid collection\par \par Cervical MRI reviewed\par No areas of critical central stenosis\par There are areas of foraminal stenosis but the patient has no radicular complaints.

## 2023-06-07 NOTE — ASSESSMENT
[FreeTextEntry1] : Today the patient states that he is still dealing with some residual radiculopathy.  Thankfully he has no red flag symptoms.  We both agreed we will continue to observe his current complaints.  I did discuss with him that if he needs any further surgery he may need to have a fusion procedure.  We will try to avoid this as much as possible given the fact that he is so young.  All questions were answered.

## 2023-06-07 NOTE — HISTORY OF PRESENT ILLNESS
[de-identified] : Today the patient states that he is able to do the majority of his activities of daily living but he does have some pain in his left leg.  He does have pain over his anterior thigh.  He also has areas of weakness in his left leg.  He denies any bowel bladder issues.  He denies any saddle anesthesia.  He states he occasionally gets back pain.  He wakes up with a stiff back as well.\par \par 04/01/22\par Today the patient is now approximately 3 months out from surgery.  He has had a return of some of his radicular symptoms down his left leg.  As result we did get an MRI which did not show any recurrent disc herniation or any severe stenosis or any substantial stenosis.  The patient has already seen his pain management physician who has been giving him injections as needed.\par \par 01/2022\par Today the patient is 6 weeks out from surgery.  Overall he is progressing very well.  He denies any severe radicular type symptoms down his legs.  He does note some slight weakness on his left leg as compared to his right but he does feel this is improving.  He denies any bowel bladder issues.  He denies any saddle anesthesia.\par \par 12/29/21\par Today the patient states that he has been feeling like his left leg is stronger now that he is now 2 weeks status post microdiscectomy. He states that he has no severe radicular type symptoms. He does have some mild to moderate left leg cramping but this is currently controlled with medical medications. He has no bowel bladder issues. He has no saddle anesthesia. He is off all pain medications besides an occasional tramadol. Overall he is very pleased with his recovery to date.\par \par 12/08/21\par Today the patient states that he still dealing with back and left lower extremity symptoms.  Fortunately he has had no worsening of his weakness.  He has had no issues with bowel bladder incontinence.  He has no saddle anesthesia.  He does still have numbness over his left anterior lateral thigh.  He is still having radicular type symptoms down his left leg as well.\par \par 12/03/21\par This is a 46-year-old male that is here today for evaluation of his back and left lower extremity symptoms.  He does have pain which radiates down his left anterior lateral thigh.  His symptoms are above his knee.  He does feel diffusely weaker in his left leg.  The symptoms been ongoing and worsening over the past 4 weeks.  He has been previously treated with epidural steroid injection as well as oral steroids.  Unfortunately symptoms have persisted.  He does have a previous history of an L3-L4 microdiscectomy which she did well from.  He is here today to discuss his treatment options.  He denies any bowel bladder issues.  He denies any saddle anesthesia.

## 2023-07-27 NOTE — PRE-OP CHECKLIST - SPO2 (%)
"Medicare/insurances offer certain visits called \"wellness/annual\" that allows for time to deal with and  review the many aspects of \"being well\" that just might not get mentioned during other visits with your doctor through the year.  This includes things like reviews of health screenings (mammograms, various labs),  weight, exercise, vaccines for just a few examples.      In order to help you with this we wish to make you aware of a few things for you to consider:    1. Advanced directives.  These are documents used to help direct your care if your health/situation should reach a point that you cannot make your own decisions.  While it is likely you do not currently have a need for these documents now; it is something that we all might face at any time.   The hand outs you are being given today are simply for you to review and use to learn more about these documents and consider them as you wish.      2. Vaccines: Certain vaccines are important after age 50, 60, and 65 and some health situations (for example COPD), require even boosters beyond age 65.  We are happy to review with you your vaccine status and vaccines that might be needed for you at this point:      a. Tetanus.   Like anyone this needs to given every 10 years; sooner for/with lacerations/wounds.   Likely when getting this booster it needs to be a tetanus called Tdap (tetanus mixed with diptheria and pertussis).   Years ago you had this vaccine.  We now know we can lose our immunity to pertussis (a part of this vaccine) and run a risk of catching this.  Now only would this make us ill; but more importantly we can spread this to very young children (and for them it can be a much more dangerous illness).   We call this the grandparent vaccine for this reason.     b. Pneumonia (strept).   This comes now with three brands.   Previously it was recommended to take prevnar and a year later pneumovax 23.  Now pneumonia 20 is replacing these with a one time " pneumonia vaccine.   Even if you have had these before; we need to review when and your current health situation/s as you may need boosters and even recently the CDC has made recent/new recommendations for pneumovax.      c. Shingles.  You do not want to catch shingles.  Though you will recover from this; the pain associated with shingles can be severe.  Even if you have had the now older zostavax, or have had shingles; it is recommended you still get the Shingrix (the new vaccine just available early 2018 shingles vaccine).  A new shingles vaccine (a shot to lower your chance of catching shingles) is now available (shingrix).  This vaccine is the second vaccine created for this purpose; (we have had zostavax for years).  Shingrix provides a much better and longer immunity for shingles than zostavax.  For this and other reasons Shingrix can be started at age 50.  If you have had zostavax in the past; you can still take Shingrix.  Beginning 2023 medicare no longer requires a co-pay for this (ie: it is free)    This vaccine is not paid for in a doctor's office by medicare, medicaid and probably most insurances.  Like zostavax; this is covered in drug stores.  This is a vaccine that if you chose to get you need to get at a drug store that gives vaccines (like [facility], "[facility]" pharmacy and [facility].      d. Yearly flu vaccine given from September through April each year (there is a special vaccine for those over 65).     e. Travel vaccines:  If you are one to do international travel; be sure and ask us for any particular unusual vaccines you may need.     f.  Miscellaneous:  If you have certain health situations/disease you may need specific/particular vaccines not give to the general public.     g.  Covid: currently recommended everyone over 6m  The brands Pfizer/Moderna are for 3 total shots as immunity will wane from less than this.  [employer] has a version that comes with recommendations for an  initial vaccine and booster after.  I no longer recommend J&J as a first choice as Pfizer/Moderna are readily available.  If you have had an initial J&J I recommend you booster with Moderna.   I strongly recommend covid vaccination; being unvaccinated or partially vaccinated carries real risk for disease and even death.     Because of many restrictions on this office always having all the above vaccines; you may be advised to work with your local health department to keep up with your individual vaccine needs.    The vaccines we have on record for you include:   Immunization History   Administered Date(s) Administered    COVID-19 (MODERNA) 1st,2nd,3rd Dose Monovalent 03/03/2021, 03/31/2021    FLUAD TRI 65YR+ 11/22/2019    Fluad Quad 65+ 10/06/2020    Fluzone High Dose =>65 Years ([employer] ONLY) 10/09/2018    Fluzone High-Dose 65+yrs 12/27/2021, 01/26/2023    Fluzone Quad >6mos (Multi-dose) 10/21/2015, 01/20/2017, 01/29/2018    Influenza Whole 10/21/2015    Pneumococcal Conjugate 13-Valent (PCV13) 10/21/2015    Shingrix 06/09/2023       If you have record of other vaccines from vaccine clinics, [facility], [facility] and like and want them to show in your chart here; please talk to our nurses about having your vaccine record updated. We would be very pleased if you would take the time to get us this information to keep you main health record here current.     3. Exercise: regular cardio exercise something everyone should consider and try to do; even if health limitations (ie find that exercise UE/LE/cardio that they can tolerate).   Normal weight a goal for everyone (as we discussed)    4. Healthy diet helpful for weight management, illness prevention.     5. If over 50-screening exams include men PSA/rectal exam, women mammograms, and everyone colonoscopy screening for colon cancer.    6. If you use tobacco of any kind or e-products you should stop. We are providing you some information to consider that could make this  process easier.      ##################################           98

## 2024-03-29 ENCOUNTER — NON-APPOINTMENT (OUTPATIENT)
Age: 49
End: 2024-03-29

## 2025-03-07 NOTE — ED PROVIDER NOTE - DISCHARGE DATE
Patient arrives to the ED via [facility] from home cc decreased output of guerrero catheter x 3 hours. +groin pain  Writer spoke with patient's wife, Shobha, who reports she was woken up by patient ~ 3 hours PTA c/o pain to penis and groin. She checked catheter and noticed little to no drainage. Per wife patient had catheter changed Friday 2/28/25 by a new at home RN and feels she did not place it properly.   Patient has leg bag in place with minimal dark yellow urine.   HX: cognitive deficits- A&O to person and place- disoriented to time.     Shobha, patient's wife and SDM provides consent for tx.    17-Sep-2019